# Patient Record
Sex: MALE | Race: WHITE | NOT HISPANIC OR LATINO | Employment: OTHER | URBAN - METROPOLITAN AREA
[De-identification: names, ages, dates, MRNs, and addresses within clinical notes are randomized per-mention and may not be internally consistent; named-entity substitution may affect disease eponyms.]

---

## 2020-11-04 ENCOUNTER — OFFICE VISIT (OUTPATIENT)
Dept: INTERNAL MEDICINE | Facility: CLINIC | Age: 35
End: 2020-11-04
Payer: OTHER GOVERNMENT

## 2020-11-04 VITALS
HEART RATE: 71 BPM | HEIGHT: 67 IN | BODY MASS INDEX: 25.81 KG/M2 | WEIGHT: 164.44 LBS | SYSTOLIC BLOOD PRESSURE: 120 MMHG | OXYGEN SATURATION: 99 % | DIASTOLIC BLOOD PRESSURE: 72 MMHG

## 2020-11-04 DIAGNOSIS — Z00.00 ENCOUNTER FOR HEALTH MAINTENANCE EXAMINATION IN ADULT: Primary | ICD-10-CM

## 2020-11-04 DIAGNOSIS — Z09 STATUS POST APPENDECTOMY, FOLLOW-UP EXAM: ICD-10-CM

## 2020-11-04 DIAGNOSIS — Z90.49 STATUS POST PARTIAL RESECTION OF COLON: ICD-10-CM

## 2020-11-04 DIAGNOSIS — Z13.220 SCREENING CHOLESTEROL LEVEL: ICD-10-CM

## 2020-11-04 DIAGNOSIS — Z11.4 ENCOUNTER FOR SCREENING FOR HIV: ICD-10-CM

## 2020-11-04 DIAGNOSIS — Z01.89 ROUTINE LAB DRAW: ICD-10-CM

## 2020-11-04 DIAGNOSIS — Z11.59 ENCOUNTER FOR HEPATITIS C SCREENING TEST FOR LOW RISK PATIENT: ICD-10-CM

## 2020-11-04 DIAGNOSIS — K35.30 ACUTE APPENDICITIS WITH LOCALIZED PERITONITIS WITHOUT PERFORATION, UNSPECIFIED WHETHER ABSCESS PRESENT, UNSPECIFIED WHETHER GANGRENE PRESENT: ICD-10-CM

## 2020-11-04 DIAGNOSIS — E66.3 OVERWEIGHT (BMI 25.0-29.9): ICD-10-CM

## 2020-11-04 PROBLEM — Z98.890 OTHER SPECIFIED POSTPROCEDURAL STATES: Status: ACTIVE | Noted: 2020-10-28

## 2020-11-04 PROBLEM — K35.80 ACUTE APPENDICITIS: Status: ACTIVE | Noted: 2020-10-27

## 2020-11-04 PROBLEM — K65.0: Status: ACTIVE | Noted: 2020-10-29

## 2020-11-04 PROCEDURE — 99999 PR PBB SHADOW E&M-NEW PATIENT-LVL IV: ICD-10-PCS | Mod: PBBFAC,,, | Performed by: NURSE PRACTITIONER

## 2020-11-04 PROCEDURE — 99385 PREV VISIT NEW AGE 18-39: CPT | Mod: S$PBB,,, | Performed by: NURSE PRACTITIONER

## 2020-11-04 PROCEDURE — 99999 PR PBB SHADOW E&M-NEW PATIENT-LVL IV: CPT | Mod: PBBFAC,,, | Performed by: NURSE PRACTITIONER

## 2020-11-04 PROCEDURE — 99204 OFFICE O/P NEW MOD 45 MIN: CPT | Mod: PBBFAC | Performed by: NURSE PRACTITIONER

## 2020-11-04 PROCEDURE — 99385 PR PREVENTIVE VISIT,NEW,18-39: ICD-10-PCS | Mod: S$PBB,,, | Performed by: NURSE PRACTITIONER

## 2020-11-04 RX ORDER — AMOXICILLIN AND CLAVULANATE POTASSIUM 875; 125 MG/1; MG/1
TABLET, FILM COATED ORAL
Status: ON HOLD | COMMUNITY
Start: 2020-11-01 | End: 2020-11-17 | Stop reason: HOSPADM

## 2020-11-04 RX ORDER — IBUPROFEN 600 MG/1
600 TABLET ORAL 3 TIMES DAILY
Status: ON HOLD | COMMUNITY
End: 2020-11-17 | Stop reason: HOSPADM

## 2020-11-04 RX ORDER — TRAMADOL HYDROCHLORIDE 50 MG/1
50 TABLET ORAL EVERY 12 HOURS PRN
Status: ON HOLD | COMMUNITY
Start: 2020-11-01 | End: 2020-11-17 | Stop reason: HOSPADM

## 2020-11-04 RX ORDER — CARBOXYMETHYLCELLULOSE SODIUM 5 MG/ML
SOLUTION/ DROPS OPHTHALMIC
Status: ON HOLD | COMMUNITY
Start: 2020-10-14 | End: 2020-11-17 | Stop reason: HOSPADM

## 2020-11-04 NOTE — PATIENT INSTRUCTIONS
Fasting lab orders, will call with results, if results ok, RTC in 1 yr for annual or sooner prn with one of MDs I work with who can be your new PCP: Dr. Landry Michael, Dr. Dionisio Stoddard, Dr. Elo Bradshaw, Dr. Alexandria James, Dr. Tano Benjamin, or  Dr. Gunnar Bedoya     F/U appt 11- with MD to clear to return to work on 12-1-2020

## 2020-11-04 NOTE — PROGRESS NOTES
Subjective:       Patient ID: Giuseppe Valdez is a 35 y.o. male.    Chief Complaint: Establish Care    New pt in to establish care. No previous PCP on file.    Recently in hospital in [place] at [facility] on 10-24-20 with peritonitis, gangrenous appendicitis, appendectomy performed and also had to have partial resection of colon afterwards, surgery done 10-. Discharged on 11-1-2020. He needs FMLA paperwork filled out for his job post op for 6 weeks Starting from 10- through 11-, return on 12-1-2020. 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 Still has some pain at puncture site from surgery. He is on oral Augmentin and prn tramadol for post op pain. He will not be able to fly on prn opioid pain meds and also cannot perform heavy lifting 6 weeks post op.    Review of Systems   Constitutional: Negative for activity change, appetite change, chills, fever and unexpected weight change.   HENT: Negative for hearing loss and voice change.    Eyes: Negative for visual disturbance.   Respiratory: Negative for apnea, cough, chest tightness and shortness of breath.    Cardiovascular: Negative for chest pain, palpitations and leg swelling.   Gastrointestinal: Positive for abdominal pain. Negative for abdominal distention, blood in stool, constipation, diarrhea, nausea and vomiting.        Post op as documented in HPI   Endocrine: Negative for cold intolerance, heat intolerance, polydipsia, polyphagia and polyuria.   Genitourinary: Negative for difficulty urinating, dysuria and penile pain.   Musculoskeletal: Negative for arthralgias and myalgias.   Integumentary:  Negative for color change, pallor, rash and wound.   Allergic/Immunologic: Negative for environmental allergies, food allergies and frequent infections.   Neurological: Negative for dizziness, weakness, light-headedness, numbness and headaches.   Hematological: Negative for adenopathy. Does not bruise/bleed easily.    Psychiatric/Behavioral: Negative for agitation, behavioral problems, sleep disturbance and suicidal ideas.     Current Outpatient Medications:     amoxicillin-clavulanate 875-125mg (AUGMENTIN) 875-125 mg per tablet, , Disp: , Rfl:     ibuprofen (ADVIL,MOTRIN) 600 MG tablet, Take 600 mg by mouth 3 (three) times daily., Disp: , Rfl:     REFRESH TEARS 0.5 % Drop, , Disp: , Rfl:     traMADoL (ULTRAM) 50 mg tablet, 50 mg every 12 (twelve) hours as needed for Pain. , Disp: , Rfl:     Patient Active Problem List   Diagnosis    Acute appendicitis with localized peritonitis    Acute appendicitis    Acute generalized peritonitis    Other specified postprocedural states    Status post appendectomy, follow-up exam    Status post partial resection of colon     History reviewed. No pertinent past medical history.     Past Surgical History:   Procedure Laterality Date    APPENDECTOMY  29-30Oct20    COLON SURGERY  29-30Oct20    right tibula and fibula retained hardware      right wrist reconstruction of torn ligaments Right       Social History     Socioeconomic History    Marital status: Single     Spouse name: Not on file    Number of children: Not on file    Years of education: Not on file    Highest education level: Not on file   Occupational History    Not on file   Social Needs    Financial resource strain: Not on file    Food insecurity     Worry: Not on file     Inability: Not on file    Transportation needs     Medical: Not on file     Non-medical: Not on file   Tobacco Use    Smoking status: Passive Smoke Exposure - Never Smoker    Smokeless tobacco: Never Used   Substance and Sexual Activity    Alcohol use: Yes     Alcohol/week: 3.0 standard drinks     Types: 3 Cans of beer per week    Drug use: Never    Sexual activity: Yes     Partners: Female   Lifestyle    Physical activity     Days per week: Not on file     Minutes per session: Not on file    Stress: Not at all   Relationships     "Social connections     Talks on phone: Not on file     Gets together: Not on file     Attends [employer] service: Not on file     Active member of club or organization: Not on file     Attends meetings of clubs or organizations: Not on file     Relationship status: Not on file   Other Topics Concern    Not on file   Social History Narrative    Not on file     History reviewed. No pertinent family history.     Objective:       Vitals:    11/04/20 1129   BP: 120/72   Pulse: 71   SpO2: 99%   Weight: 74.6 kg (164 lb 7.4 oz)   Height: 5' 7" (1.702 m)   PainSc: 0-No pain      Body mass index is 25.76 kg/m².     Physical Exam  Vitals signs and nursing note reviewed.   Constitutional:       Appearance: Normal appearance. He is well-developed.   HENT:      Head: Normocephalic.      Nose: Nose normal.      Mouth/Throat:      Mouth: Mucous membranes are moist.      Pharynx: Oropharynx is clear.   Eyes:      General: Lids are normal. Lids are everted, no foreign bodies appreciated.      Extraocular Movements: Extraocular movements intact.      Conjunctiva/sclera: Conjunctivae normal.      Pupils: Pupils are equal, round, and reactive to light.   Neck:      Musculoskeletal: Full passive range of motion without pain, normal range of motion and neck supple.      Vascular: No carotid bruit or JVD.      Trachea: Trachea normal.   Cardiovascular:      Rate and Rhythm: Normal rate and regular rhythm.      Pulses: Normal pulses.      Heart sounds: Normal heart sounds.   Pulmonary:      Effort: Pulmonary effort is normal.      Breath sounds: Normal breath sounds.   Abdominal:      General: Abdomen is flat. Bowel sounds are normal.      Palpations: Abdomen is soft.      Tenderness: There is abdominal tenderness.      Comments: Mild tenderness around umbilicus    Surgical puncture sites noted to abdomen, scabbed and healed   Musculoskeletal: Normal range of motion.   Skin:     General: Skin is warm and dry.      Capillary Refill: " Capillary refill takes less than 2 seconds.   Neurological:      General: No focal deficit present.      Mental Status: He is alert and oriented to person, place, and time.   Psychiatric:         Mood and Affect: Mood normal.         Speech: Speech normal.         Behavior: Behavior normal.         Thought Content: Thought content normal.         Judgment: Judgment normal.       Assessment:       1. Encounter for health maintenance examination in adult    2. Routine lab draw    3. Encounter for hepatitis C screening test for low risk patient    4. Encounter for screening for HIV    5. Screening cholesterol level    6. BMI 25.0-25.9,adult    7. Overweight (BMI 25.0-29.9)    8. Acute appendicitis with localized peritonitis without perforation, unspecified whether abscess present, unspecified whether gangrene present    9. Status post appendectomy, follow-up exam    10. Status post partial resection of colon            Plan:     Giuseppe was seen today for establish care.    Diagnoses and all orders for this visit:    Encounter for health maintenance examination in adult  Annual wellness exam completed.    All medications, histories, and concerns reviewed, reconciled, and addressed.    Appropriate Screenings per pt's sex and age have been reviewed and discussed with pt.    BMI reviewed.    Routine lab draw  -     CBC Auto Differential; Future  -     Comprehensive Metabolic Panel; Future  -     TSH; Future    Encounter for hepatitis C screening test for low risk patient  -     Hepatitis C Antibody; Future    Encounter for screening for HIV  -     HIV 1/2 Ag/Ab (4th Gen); Future    Screening cholesterol level  -     Lipid Panel; Future    BMI 25.0-25.9,adult  BMI reviewed    Overweight (BMI 25.0-29.9)  BMI reivewed    Acute appendicitis with localized peritonitis without perforation, unspecified whether abscess present, unspecified whether gangrene present  Status post appendectomy, follow-up exam  Status post partial  resection of colon  FMLA paperwork will be completed dated 10- through 11- with a return date of 12-1-2020. Pt is not to perform  duties due to prn opioid pain meds post op and also no heavy lifting or strenuous activity for 6 weeks post abdominal surgery.    Fasting lab orders, will call with results, if results ok, RTC in 1 yr for annual or sooner prn with one of MDs I work with who can be your new PCP: Dr. Landry Michael, Dr. Dionisio Stoddard, Dr. Elo Bradshaw, Dr. Alexandria James, Dr. Tano Benjamin, or  Dr. Gunnar Bedoya     F/U appt 11- with MD to clear to return to work on 12-1-2020.    Follow up in about 19 days (around 11/23/2020) for with one of MDs to clear pt to return to work.

## 2020-11-05 ENCOUNTER — PATIENT MESSAGE (OUTPATIENT)
Dept: INTERNAL MEDICINE | Facility: CLINIC | Age: 35
End: 2020-11-05

## 2020-11-07 ENCOUNTER — HOSPITAL ENCOUNTER (INPATIENT)
Facility: OTHER | Age: 35
LOS: 10 days | Discharge: HOME | DRG: 388 | End: 2020-11-17
Attending: EMERGENCY MEDICINE | Admitting: EMERGENCY MEDICINE
Payer: OTHER GOVERNMENT

## 2020-11-07 DIAGNOSIS — G89.18 POST-OP PAIN: ICD-10-CM

## 2020-11-07 DIAGNOSIS — R93.5 ABNORMAL CT OF THE ABDOMEN: ICD-10-CM

## 2020-11-07 DIAGNOSIS — K56.609 SBO (SMALL BOWEL OBSTRUCTION): Primary | ICD-10-CM

## 2020-11-07 PROBLEM — T81.43XA POSTPROCEDURAL INTRAABDOMINAL ABSCESS: Status: ACTIVE | Noted: 2020-11-07

## 2020-11-07 PROBLEM — R79.89 ELEVATED LFTS: Status: ACTIVE | Noted: 2020-11-07

## 2020-11-07 LAB
ALBUMIN SERPL BCP-MCNC: 3.9 G/DL (ref 3.5–5.2)
ALP SERPL-CCNC: 131 U/L (ref 55–135)
ALT SERPL W/O P-5'-P-CCNC: 210 U/L (ref 10–44)
ANION GAP SERPL CALC-SCNC: 14 MMOL/L (ref 8–16)
AST SERPL-CCNC: 50 U/L (ref 10–40)
BASOPHILS # BLD AUTO: 0.05 K/UL (ref 0–0.2)
BASOPHILS NFR BLD: 0.3 % (ref 0–1.9)
BILIRUB SERPL-MCNC: 0.3 MG/DL (ref 0.1–1)
BUN SERPL-MCNC: 18 MG/DL (ref 6–20)
CALCIUM SERPL-MCNC: 9.6 MG/DL (ref 8.7–10.5)
CHLORIDE SERPL-SCNC: 100 MMOL/L (ref 95–110)
CO2 SERPL-SCNC: 23 MMOL/L (ref 23–29)
CREAT SERPL-MCNC: 1.2 MG/DL (ref 0.5–1.4)
CTP QC/QA: YES
DIFFERENTIAL METHOD: ABNORMAL
EOSINOPHIL # BLD AUTO: 0 K/UL (ref 0–0.5)
EOSINOPHIL NFR BLD: 0.2 % (ref 0–8)
ERYTHROCYTE [DISTWIDTH] IN BLOOD BY AUTOMATED COUNT: 12 % (ref 11.5–14.5)
EST. GFR  (AFRICAN AMERICAN): >60 ML/MIN/1.73 M^2
EST. GFR  (NON AFRICAN AMERICAN): >60 ML/MIN/1.73 M^2
GLUCOSE SERPL-MCNC: 140 MG/DL (ref 70–110)
HCT VFR BLD AUTO: 41.3 % (ref 40–54)
HGB BLD-MCNC: 14.1 G/DL (ref 14–18)
IMM GRANULOCYTES # BLD AUTO: 0.11 K/UL (ref 0–0.04)
IMM GRANULOCYTES NFR BLD AUTO: 0.7 % (ref 0–0.5)
LACTATE SERPL-SCNC: 0.6 MMOL/L (ref 0.5–2.2)
LIPASE SERPL-CCNC: 40 U/L (ref 4–60)
LYMPHOCYTES # BLD AUTO: 1.9 K/UL (ref 1–4.8)
LYMPHOCYTES NFR BLD: 11.1 % (ref 18–48)
MCH RBC QN AUTO: 30.6 PG (ref 27–31)
MCHC RBC AUTO-ENTMCNC: 34.1 G/DL (ref 32–36)
MCV RBC AUTO: 90 FL (ref 82–98)
MONOCYTES # BLD AUTO: 0.9 K/UL (ref 0.3–1)
MONOCYTES NFR BLD: 5.1 % (ref 4–15)
NEUTROPHILS # BLD AUTO: 13.9 K/UL (ref 1.8–7.7)
NEUTROPHILS NFR BLD: 82.6 % (ref 38–73)
NRBC BLD-RTO: 0 /100 WBC
PLATELET # BLD AUTO: 459 K/UL (ref 150–350)
PMV BLD AUTO: 8.7 FL (ref 9.2–12.9)
POTASSIUM SERPL-SCNC: 4 MMOL/L (ref 3.5–5.1)
PROT SERPL-MCNC: 8.2 G/DL (ref 6–8.4)
RBC # BLD AUTO: 4.61 M/UL (ref 4.6–6.2)
SARS-COV-2 RDRP RESP QL NAA+PROBE: NEGATIVE
SODIUM SERPL-SCNC: 137 MMOL/L (ref 136–145)
WBC # BLD AUTO: 16.87 K/UL (ref 3.9–12.7)

## 2020-11-07 PROCEDURE — 25000003 PHARM REV CODE 250: Performed by: INTERNAL MEDICINE

## 2020-11-07 PROCEDURE — 85025 COMPLETE CBC W/AUTO DIFF WBC: CPT

## 2020-11-07 PROCEDURE — 96376 TX/PRO/DX INJ SAME DRUG ADON: CPT

## 2020-11-07 PROCEDURE — 83690 ASSAY OF LIPASE: CPT

## 2020-11-07 PROCEDURE — 25500020 PHARM REV CODE 255: Performed by: EMERGENCY MEDICINE

## 2020-11-07 PROCEDURE — 80053 COMPREHEN METABOLIC PANEL: CPT

## 2020-11-07 PROCEDURE — 96374 THER/PROPH/DIAG INJ IV PUSH: CPT

## 2020-11-07 PROCEDURE — 99223 PR INITIAL HOSPITAL CARE,LEVL III: ICD-10-PCS | Mod: ,,, | Performed by: INTERNAL MEDICINE

## 2020-11-07 PROCEDURE — 94761 N-INVAS EAR/PLS OXIMETRY MLT: CPT

## 2020-11-07 PROCEDURE — 83605 ASSAY OF LACTIC ACID: CPT

## 2020-11-07 PROCEDURE — 63600175 PHARM REV CODE 636 W HCPCS: Performed by: INTERNAL MEDICINE

## 2020-11-07 PROCEDURE — U0002 COVID-19 LAB TEST NON-CDC: HCPCS | Performed by: EMERGENCY MEDICINE

## 2020-11-07 PROCEDURE — 25000003 PHARM REV CODE 250: Performed by: EMERGENCY MEDICINE

## 2020-11-07 PROCEDURE — 99285 EMERGENCY DEPT VISIT HI MDM: CPT | Mod: 25

## 2020-11-07 PROCEDURE — 63600175 PHARM REV CODE 636 W HCPCS

## 2020-11-07 PROCEDURE — 63600175 PHARM REV CODE 636 W HCPCS: Performed by: EMERGENCY MEDICINE

## 2020-11-07 PROCEDURE — 96361 HYDRATE IV INFUSION ADD-ON: CPT

## 2020-11-07 PROCEDURE — 96375 TX/PRO/DX INJ NEW DRUG ADDON: CPT

## 2020-11-07 PROCEDURE — 99223 1ST HOSP IP/OBS HIGH 75: CPT | Mod: ,,, | Performed by: INTERNAL MEDICINE

## 2020-11-07 PROCEDURE — 11000001 HC ACUTE MED/SURG PRIVATE ROOM

## 2020-11-07 RX ORDER — HYDROMORPHONE HYDROCHLORIDE 1 MG/ML
0.5 INJECTION, SOLUTION INTRAMUSCULAR; INTRAVENOUS; SUBCUTANEOUS
Status: DISCONTINUED | OUTPATIENT
Start: 2020-11-07 | End: 2020-11-09

## 2020-11-07 RX ORDER — HYDROMORPHONE HYDROCHLORIDE 1 MG/ML
0.5 INJECTION, SOLUTION INTRAMUSCULAR; INTRAVENOUS; SUBCUTANEOUS
Status: COMPLETED | OUTPATIENT
Start: 2020-11-07 | End: 2020-11-07

## 2020-11-07 RX ORDER — SODIUM CHLORIDE 9 MG/ML
INJECTION, SOLUTION INTRAVENOUS CONTINUOUS
Status: DISCONTINUED | OUTPATIENT
Start: 2020-11-07 | End: 2020-11-07

## 2020-11-07 RX ORDER — MORPHINE SULFATE 4 MG/ML
4 INJECTION, SOLUTION INTRAMUSCULAR; INTRAVENOUS
Status: ACTIVE | OUTPATIENT
Start: 2020-11-07 | End: 2020-11-07

## 2020-11-07 RX ORDER — KETOROLAC TROMETHAMINE 30 MG/ML
15 INJECTION, SOLUTION INTRAMUSCULAR; INTRAVENOUS EVERY 6 HOURS PRN
Status: DISCONTINUED | OUTPATIENT
Start: 2020-11-07 | End: 2020-11-09

## 2020-11-07 RX ORDER — GLUCAGON 1 MG
1 KIT INJECTION
Status: DISCONTINUED | OUTPATIENT
Start: 2020-11-07 | End: 2020-11-17 | Stop reason: HOSPADM

## 2020-11-07 RX ORDER — HYDROMORPHONE HYDROCHLORIDE 1 MG/ML
0.5 INJECTION, SOLUTION INTRAMUSCULAR; INTRAVENOUS; SUBCUTANEOUS
Status: DISCONTINUED | OUTPATIENT
Start: 2020-11-07 | End: 2020-11-07

## 2020-11-07 RX ORDER — IBUPROFEN 200 MG
24 TABLET ORAL
Status: DISCONTINUED | OUTPATIENT
Start: 2020-11-07 | End: 2020-11-17 | Stop reason: HOSPADM

## 2020-11-07 RX ORDER — ACETAMINOPHEN 325 MG/1
650 TABLET ORAL EVERY 4 HOURS PRN
Status: DISCONTINUED | OUTPATIENT
Start: 2020-11-07 | End: 2020-11-17 | Stop reason: HOSPADM

## 2020-11-07 RX ORDER — MORPHINE SULFATE 4 MG/ML
4 INJECTION, SOLUTION INTRAMUSCULAR; INTRAVENOUS EVERY 4 HOURS PRN
Status: DISCONTINUED | OUTPATIENT
Start: 2020-11-07 | End: 2020-11-07

## 2020-11-07 RX ORDER — MORPHINE SULFATE 4 MG/ML
4 INJECTION, SOLUTION INTRAMUSCULAR; INTRAVENOUS
Status: COMPLETED | OUTPATIENT
Start: 2020-11-07 | End: 2020-11-07

## 2020-11-07 RX ORDER — MORPHINE SULFATE 4 MG/ML
4 INJECTION, SOLUTION INTRAMUSCULAR; INTRAVENOUS EVERY 4 HOURS PRN
Status: DISCONTINUED | OUTPATIENT
Start: 2020-11-07 | End: 2020-11-07 | Stop reason: SDUPTHER

## 2020-11-07 RX ORDER — MORPHINE SULFATE 10 MG/ML
INJECTION INTRAMUSCULAR; INTRAVENOUS; SUBCUTANEOUS
Status: COMPLETED
Start: 2020-11-07 | End: 2020-11-07

## 2020-11-07 RX ORDER — MORPHINE SULFATE 2 MG/ML
2 INJECTION, SOLUTION INTRAMUSCULAR; INTRAVENOUS EVERY 4 HOURS PRN
Status: DISCONTINUED | OUTPATIENT
Start: 2020-11-07 | End: 2020-11-07

## 2020-11-07 RX ORDER — SODIUM CHLORIDE 0.9 % (FLUSH) 0.9 %
10 SYRINGE (ML) INJECTION
Status: DISCONTINUED | OUTPATIENT
Start: 2020-11-07 | End: 2020-11-17 | Stop reason: HOSPADM

## 2020-11-07 RX ORDER — ONDANSETRON 2 MG/ML
4 INJECTION INTRAMUSCULAR; INTRAVENOUS EVERY 8 HOURS PRN
Status: DISCONTINUED | OUTPATIENT
Start: 2020-11-07 | End: 2020-11-07

## 2020-11-07 RX ORDER — ONDANSETRON 2 MG/ML
4 INJECTION INTRAMUSCULAR; INTRAVENOUS EVERY 6 HOURS PRN
Status: DISCONTINUED | OUTPATIENT
Start: 2020-11-07 | End: 2020-11-17 | Stop reason: HOSPADM

## 2020-11-07 RX ORDER — SODIUM CHLORIDE 9 MG/ML
1000 INJECTION, SOLUTION INTRAVENOUS
Status: COMPLETED | OUTPATIENT
Start: 2020-11-07 | End: 2020-11-07

## 2020-11-07 RX ORDER — SODIUM CHLORIDE, SODIUM LACTATE, POTASSIUM CHLORIDE, CALCIUM CHLORIDE 600; 310; 30; 20 MG/100ML; MG/100ML; MG/100ML; MG/100ML
INJECTION, SOLUTION INTRAVENOUS CONTINUOUS
Status: DISCONTINUED | OUTPATIENT
Start: 2020-11-07 | End: 2020-11-16

## 2020-11-07 RX ORDER — IBUPROFEN 200 MG
16 TABLET ORAL
Status: DISCONTINUED | OUTPATIENT
Start: 2020-11-07 | End: 2020-11-17 | Stop reason: HOSPADM

## 2020-11-07 RX ADMIN — MORPHINE SULFATE 4 MG: 4 INJECTION, SOLUTION INTRAMUSCULAR; INTRAVENOUS at 03:11

## 2020-11-07 RX ADMIN — HYDROMORPHONE HYDROCHLORIDE 0.5 MG: 1 INJECTION, SOLUTION INTRAMUSCULAR; INTRAVENOUS; SUBCUTANEOUS at 02:11

## 2020-11-07 RX ADMIN — SODIUM CHLORIDE, SODIUM LACTATE, POTASSIUM CHLORIDE, AND CALCIUM CHLORIDE: .6; .31; .03; .02 INJECTION, SOLUTION INTRAVENOUS at 10:11

## 2020-11-07 RX ADMIN — MORPHINE SULFATE 4 MG: 10 INJECTION INTRAVENOUS at 07:11

## 2020-11-07 RX ADMIN — PIPERACILLIN AND TAZOBACTAM 4.5 G: 4; .5 INJECTION, POWDER, LYOPHILIZED, FOR SOLUTION INTRAVENOUS; PARENTERAL at 06:11

## 2020-11-07 RX ADMIN — MORPHINE SULFATE 4 MG: 4 INJECTION, SOLUTION INTRAMUSCULAR; INTRAVENOUS at 04:11

## 2020-11-07 RX ADMIN — PIPERACILLIN AND TAZOBACTAM 4.5 G: 4; .5 INJECTION, POWDER, LYOPHILIZED, FOR SOLUTION INTRAVENOUS; PARENTERAL at 02:11

## 2020-11-07 RX ADMIN — HYDROMORPHONE HYDROCHLORIDE 0.5 MG: 1 INJECTION, SOLUTION INTRAMUSCULAR; INTRAVENOUS; SUBCUTANEOUS at 08:11

## 2020-11-07 RX ADMIN — KETOROLAC TROMETHAMINE 15 MG: 30 INJECTION, SOLUTION INTRAMUSCULAR; INTRAVENOUS at 10:11

## 2020-11-07 RX ADMIN — HYDROMORPHONE HYDROCHLORIDE 0.5 MG: 1 INJECTION, SOLUTION INTRAMUSCULAR; INTRAVENOUS; SUBCUTANEOUS at 06:11

## 2020-11-07 RX ADMIN — HYDROMORPHONE HYDROCHLORIDE 0.5 MG: 1 INJECTION, SOLUTION INTRAMUSCULAR; INTRAVENOUS; SUBCUTANEOUS at 10:11

## 2020-11-07 RX ADMIN — SODIUM CHLORIDE 1000 ML: 0.9 INJECTION, SOLUTION INTRAVENOUS at 05:11

## 2020-11-07 RX ADMIN — PIPERACILLIN AND TAZOBACTAM 4.5 G: 4; .5 INJECTION, POWDER, LYOPHILIZED, FOR SOLUTION INTRAVENOUS; PARENTERAL at 10:11

## 2020-11-07 RX ADMIN — ONDANSETRON 4 MG: 2 INJECTION INTRAMUSCULAR; INTRAVENOUS at 02:11

## 2020-11-07 RX ADMIN — KETOROLAC TROMETHAMINE 15 MG: 30 INJECTION, SOLUTION INTRAMUSCULAR; INTRAVENOUS at 07:11

## 2020-11-07 RX ADMIN — HYDROMORPHONE HYDROCHLORIDE 0.5 MG: 1 INJECTION, SOLUTION INTRAMUSCULAR; INTRAVENOUS; SUBCUTANEOUS at 01:11

## 2020-11-07 RX ADMIN — HYDROMORPHONE HYDROCHLORIDE 0.5 MG: 1 INJECTION, SOLUTION INTRAMUSCULAR; INTRAVENOUS; SUBCUTANEOUS at 09:11

## 2020-11-07 RX ADMIN — IOHEXOL 75 ML: 350 INJECTION, SOLUTION INTRAVENOUS at 04:11

## 2020-11-07 RX ADMIN — HYDROMORPHONE HYDROCHLORIDE 0.5 MG: 1 INJECTION, SOLUTION INTRAMUSCULAR; INTRAVENOUS; SUBCUTANEOUS at 04:11

## 2020-11-07 NOTE — ASSESSMENT & PLAN NOTE
Intraabdominal abscess  - s/p laparoscopic appendectomy for acute gangrenous appendicitis with associated abscess on 10/27/20  - Surgery complicated by peritonitis s/p diagnostic laparoscopy with abdominal washout 10/29/20  - CT abd/pelvis shows SBO with transition point in the R mid pelvis and a fluid collection consistent with abscess  - WBC 16  - NPO, IV fluids  - Ketorolac and dilaudid PRN for pain control  - Start empiric Zosyn 4.5 mg IV q8h  - General surgery consulted

## 2020-11-07 NOTE — HPI
34 y/o M presents to the ED today complaining of abdominal pain. He was hospitalized in [place] 10/27-11/1 where he was diagnosed with gangrenous appendicitis s/p laparoscopic appendectomy on 10/27 complicated by peritonitis s/p exploratory laparoscopy on 10/29. After discharge he was improving until early this morning when he had sudden onset of severe lower abdominal pain. He denies associated fever, chills, nausea, vomiting or diarrhea. He has been taking augmentin since discharge. He took tramadol for the pain without improvement. Pain was rated 8-9/10 at its worst after tramadol so he presented to the ED for evaluation. His last BM was yesterday. In the ED he was found to have SBO with abscess so referred for admission.    He works as a  but currently on leave due to recent surgery. He denies any medical problems and takes no chronic medications.

## 2020-11-07 NOTE — ED NOTES
Bedside report given by CARY Gutiererz. Comfort and positioning addressed. Toileting needs addressed. Pain (0/10). Pt remains attached to pulse ox and BP cycled. Bed rails up x2, bed locked and at lowest position, call remains at beside within reach of patient, instructed on use. AAOx4. RR even and unlabored. Pt updated on plan of care. Pt verbalized understanding. Will continue to monitor.

## 2020-11-07 NOTE — H&P
"[facility]  History & Physical    Patient Name: Giuseppe Valdez  MRN: [id number]  Admission Date: 11/7/2020  Attending Physician: Flor Galvan MD   Primary Care Provider: Primary Doctor No         Patient information was obtained from patient, past medical records and ER records.     Subjective:     Principal Problem:SBO (small bowel obstruction)    Chief Complaint:   Chief Complaint   Patient presents with    Abdominal Pain     intermittent, sharp periumbilical since 2000 last night. Had appendectomy last week, feels like pain is r/t "overdoing it" today. Using IBU and tramadol without relief.         HPI: 34 y/o M presents to the ED today complaining of abdominal pain. He was hospitalized in [place] 10/27-11/1 where he was diagnosed with gangrenous appendicitis s/p laparoscopic appendectomy on 10/27 complicated by peritonitis s/p exploratory laparoscopy on 10/29. After discharge he was improving until early this morning when he had sudden onset of severe lower abdominal pain. He denies associated fever, chills, nausea, vomiting or diarrhea. He has been taking augmentin since discharge. He took tramadol for the pain without improvement. Pain was rated 8-9/10 at its worst after tramadol so he presented to the ED for evaluation. His last BM was yesterday. In the ED he was found to have SBO with abscess so referred for admission.    He works as a  but currently on leave due to recent surgery. He denies any medical problems and takes no chronic medications.    History reviewed. No pertinent past medical history.    Past Surgical History:   Procedure Laterality Date    APPENDECTOMY  29-30Oct20    COLON SURGERY  29-30Oct20    right tibula and fibula retained hardware      right wrist reconstruction of torn ligaments Right        Review of patient's allergies indicates:   Allergen Reactions    Grass pollen-june grass standard     Mold        No current " facility-administered medications on file prior to encounter.      Current Outpatient Medications on File Prior to Encounter   Medication Sig    amoxicillin-clavulanate 875-125mg (AUGMENTIN) 875-125 mg per tablet     traMADoL (ULTRAM) 50 mg tablet 50 mg every 12 (twelve) hours as needed for Pain.     ibuprofen (ADVIL,MOTRIN) 600 MG tablet Take 600 mg by mouth 3 (three) times daily.    REFRESH TEARS 0.5 % Drop      Family History     None        Tobacco Use    Smoking status: Passive Smoke Exposure - Never Smoker    Smokeless tobacco: Never Used   Substance and Sexual Activity    Alcohol use: Yes     Alcohol/week: 3.0 standard drinks     Types: 3 Cans of beer per week    Drug use: Never    Sexual activity: Yes     Partners: Female     Review of Systems   Constitutional: Negative for chills and fever.   HENT: Negative.    Eyes: Negative.    Respiratory: Negative for cough and shortness of breath.    Cardiovascular: Negative for chest pain and palpitations.   Gastrointestinal: Positive for abdominal pain. Negative for abdominal distention, diarrhea, nausea and vomiting.   Genitourinary: Negative.    Musculoskeletal: Negative.    Neurological: Negative.    Psychiatric/Behavioral: Negative.    All other systems reviewed and are negative.    Objective:     Vital Signs (Most Recent):  Temp: 98.1 °F (36.7 °C) (11/07/20 0836)  Pulse: 69 (11/07/20 0836)  Resp: 18 (11/07/20 0926)  BP: 131/80 (11/07/20 0836)  SpO2: 95 % (11/07/20 0836) Vital Signs (24h Range):  Temp:  [97.7 °F (36.5 °C)-98.1 °F (36.7 °C)] 98.1 °F (36.7 °C)  Pulse:  [66-81] 69  Resp:  [18] 18  SpO2:  [95 %-99 %] 95 %  BP: (130-142)/(70-86) 131/80     Weight: 74.8 kg (165 lb)  Body mass index is 25.84 kg/m².    Physical Exam  Vitals signs and nursing note reviewed.   Constitutional:       Appearance: He is well-developed.      Comments: Lying in bed. Appears uncomfortable due to pain   HENT:      Head: Normocephalic and atraumatic.   Eyes:      General:          Right eye: No discharge.         Left eye: No discharge.      Conjunctiva/sclera: Conjunctivae normal.   Neck:      Musculoskeletal: Normal range of motion and neck supple.   Cardiovascular:      Rate and Rhythm: Normal rate and regular rhythm.      Heart sounds: Normal heart sounds.   Pulmonary:      Effort: Pulmonary effort is normal. No respiratory distress.      Breath sounds: Normal breath sounds.   Abdominal:      Tenderness: There is abdominal tenderness (throughout lower abdomen, worse in LLQ). There is guarding.      Comments: Bowel sounds absent. Abdomen non-distended. Clean, dry surgical incisions noted without erythema or drainage   Musculoskeletal: Normal range of motion.      Right lower leg: No edema.      Left lower leg: No edema.   Skin:     General: Skin is warm and dry.   Neurological:      Mental Status: He is alert and oriented to person, place, and time.   Psychiatric:         Behavior: Behavior normal.             Significant Labs:   CBC:   Recent Labs   Lab 11/07/20  0304   WBC 16.87*   HGB 14.1   HCT 41.3   *     CMP:   Recent Labs   Lab 11/07/20  0304      K 4.0      CO2 23   *   BUN 18   CREATININE 1.2   CALCIUM 9.6   PROT 8.2   ALBUMIN 3.9   BILITOT 0.3   ALKPHOS 131   AST 50*   *   ANIONGAP 14   EGFRNONAA >60     All pertinent labs within the past 24 hours have been reviewed.    Significant Imaging: I have reviewed and interpreted all pertinent imaging results/findings within the past 24 hours.    Assessment/Plan:     * SBO (small bowel obstruction)  Intraabdominal abscess  - s/p laparoscopic appendectomy for acute gangrenous appendicitis with associated abscess on 10/27/20  - Surgery complicated by peritonitis s/p diagnostic laparoscopy with abdominal washout 10/29/20  - CT abd/pelvis shows SBO with transition point in the R mid pelvis and a fluid collection consistent with abscess  - WBC 16  - NPO, IV fluids  - Ketorolac and dilaudid PRN for pain  control  - Start empiric Zosyn 4.5 mg IV q8h  - General surgery consulted    Elevated LFTs  - AST/ALT 50/210. ALP, bilirubin WNL  - Records reviewed. Prior LFTs WNL  - Liver normal on imaging  - Monitor for now      VTE Risk Mitigation (From admission, onward)         Ordered     Place sequential compression device  Until discontinued      11/07/20 0836     IP VTE LOW RISK PATIENT  Once      11/07/20 0836                   Flor Galvan MD  Department of Hospital Medicine   [facility]

## 2020-11-07 NOTE — NURSING
Pt free of trauma, falls, and injury. Pt VSS and afebrile throughout shift. Pt free of skin breakdown. Pt pain has been moderately controlled by iv pain meds and tolerated well. Pt has been voiding adequately throughout shift. Pt has call light in reach, bed alarm on, bed brakes on, side rails up x2, bed in low position, SCDs on/ Pt lying in bed in no distress. Purposeful rounding performed this shift.

## 2020-11-07 NOTE — CONSULTS
[facility]  Consult Note      Date of Consultation:11/7/2020    Reason for Consult:  Abdominal pain  SUBJECTIVE:     History of Present Illness:  35-year-old male who is status post laparoscopic appendectomy on 10/27 2020 for ruptured appendix with abscess.  This surgery was performed at the [facility] in [place].  Two days later the patient was returned to surgery for abdominal washout.  Patient's condition improved he was subsequently discharged and returned to [place].  Patient was feeling well until yesterday when he began to have severe abdominal pain.  He denied fever, chills, nausea, vomiting.  Patient presented to the emergency room where he was seen to have focally dilated loops of small bowel and small fluid collection in the posterior pelvis.  Patient's white blood cell count was [id number] on admission.    Review of patient's allergies indicates:   Allergen Reactions    Grass pollen-june grass standard     Mold        History reviewed. No pertinent past medical history.  Past Surgical History:   Procedure Laterality Date    APPENDECTOMY  29-30Oct20    COLON SURGERY  29-30Oct20    right tibula and fibula retained hardware      right wrist reconstruction of torn ligaments Right      History reviewed. No pertinent family history.  Social History     Tobacco Use    Smoking status: Passive Smoke Exposure - Never Smoker    Smokeless tobacco: Never Used   Substance Use Topics    Alcohol use: Yes     Alcohol/week: 3.0 standard drinks     Types: 3 Cans of beer per week    Drug use: Never            Physical Exam:  Well-developed well-nourished male in no acute distress.  HEENT-anicteric atraumatic  Chest-clear  Heart-regular rate and rhythm  Abdomen-mild distention, few bowel sounds, incisions clean and dry, no rebound, no guarding    Impression/plan:  Ileus, possible involving abscess  Continue antibiotics will follow with serial exam and evaluation.    Thank you for the opportunity  of seeing Giuseppe Valdez in consultation

## 2020-11-07 NOTE — ED TRIAGE NOTES
"Pt presents to ER via POV with c/o intermittent, sharp periumbilical pain that started around 2000 last night. Pt reports having appendectomy x2 weeks ago then had exploratory lap last week, and feels he "overdid it" last night. He is taking Augmentin, tramadol, and IBU with minimal relief.   "

## 2020-11-07 NOTE — SUBJECTIVE & OBJECTIVE
History reviewed. No pertinent past medical history.    Past Surgical History:   Procedure Laterality Date    APPENDECTOMY  29-30Oct20    COLON SURGERY  29-30Oct20    right tibula and fibula retained hardware      right wrist reconstruction of torn ligaments Right        Review of patient's allergies indicates:   Allergen Reactions    Grass pollen-rosanna grass standard     Mold        No current facility-administered medications on file prior to encounter.      Current Outpatient Medications on File Prior to Encounter   Medication Sig    amoxicillin-clavulanate 875-125mg (AUGMENTIN) 875-125 mg per tablet     traMADoL (ULTRAM) 50 mg tablet 50 mg every 12 (twelve) hours as needed for Pain.     ibuprofen (ADVIL,MOTRIN) 600 MG tablet Take 600 mg by mouth 3 (three) times daily.    REFRESH TEARS 0.5 % Drop      Family History     None        Tobacco Use    Smoking status: Passive Smoke Exposure - Never Smoker    Smokeless tobacco: Never Used   Substance and Sexual Activity    Alcohol use: Yes     Alcohol/week: 3.0 standard drinks     Types: 3 Cans of beer per week    Drug use: Never    Sexual activity: Yes     Partners: Female     Review of Systems   Constitutional: Negative for chills and fever.   HENT: Negative.    Eyes: Negative.    Respiratory: Negative for cough and shortness of breath.    Cardiovascular: Negative for chest pain and palpitations.   Gastrointestinal: Positive for abdominal pain. Negative for abdominal distention, diarrhea, nausea and vomiting.   Genitourinary: Negative.    Musculoskeletal: Negative.    Neurological: Negative.    Psychiatric/Behavioral: Negative.    All other systems reviewed and are negative.    Objective:     Vital Signs (Most Recent):  Temp: 98.1 °F (36.7 °C) (11/07/20 0836)  Pulse: 69 (11/07/20 0836)  Resp: 18 (11/07/20 0926)  BP: 131/80 (11/07/20 0836)  SpO2: 95 % (11/07/20 0836) Vital Signs (24h Range):  Temp:  [97.7 °F (36.5 °C)-98.1 °F (36.7 °C)] 98.1 °F (36.7  °C)  Pulse:  [66-81] 69  Resp:  [18] 18  SpO2:  [95 %-99 %] 95 %  BP: (130-142)/(70-86) 131/80     Weight: 74.8 kg (165 lb)  Body mass index is 25.84 kg/m².    Physical Exam  Vitals signs and nursing note reviewed.   Constitutional:       Appearance: He is well-developed.      Comments: Lying in bed. Appears uncomfortable due to pain   HENT:      Head: Normocephalic and atraumatic.   Eyes:      General:         Right eye: No discharge.         Left eye: No discharge.      Conjunctiva/sclera: Conjunctivae normal.   Neck:      Musculoskeletal: Normal range of motion and neck supple.   Cardiovascular:      Rate and Rhythm: Normal rate and regular rhythm.      Heart sounds: Normal heart sounds.   Pulmonary:      Effort: Pulmonary effort is normal. No respiratory distress.      Breath sounds: Normal breath sounds.   Abdominal:      Tenderness: There is abdominal tenderness (throughout lower abdomen, worse in LLQ). There is guarding.      Comments: Bowel sounds absent. Abdomen non-distended. Clean, dry surgical incisions noted without erythema or drainage   Musculoskeletal: Normal range of motion.      Right lower leg: No edema.      Left lower leg: No edema.   Skin:     General: Skin is warm and dry.   Neurological:      Mental Status: He is alert and oriented to person, place, and time.   Psychiatric:         Behavior: Behavior normal.             Significant Labs:   CBC:   Recent Labs   Lab 11/07/20  0304   WBC 16.87*   HGB 14.1   HCT 41.3   *     CMP:   Recent Labs   Lab 11/07/20  0304      K 4.0      CO2 23   *   BUN 18   CREATININE 1.2   CALCIUM 9.6   PROT 8.2   ALBUMIN 3.9   BILITOT 0.3   ALKPHOS 131   AST 50*   *   ANIONGAP 14   EGFRNONAA >60     All pertinent labs within the past 24 hours have been reviewed.    Significant Imaging: I have reviewed and interpreted all pertinent imaging results/findings within the past 24 hours.

## 2020-11-07 NOTE — ED PROVIDER NOTES
"Encounter Date: 11/7/2020       History     Chief Complaint   Patient presents with    Abdominal Pain     intermittent, sharp periumbilical since 2000 last night. Had appendectomy last week, feels like pain is r/t "overdoing it" today. Using IBU and tramadol without relief.      35-year-old male status post an appendectomy on 10/27 and an exploratory laparotomy on 10/29 presents complaining of sudden onset of sharp abdominal pain approximately 7 hr ago.  Patient states it is his lower abdomen but points to his left upper quadrant as the location of the most severe pain.  He denies any associated nausea, vomiting, fever, chills, urinary symptoms or changes in bowel movement.  Patient states he was feeling at his baseline earlier today and did a lot of work around the house.  Patient thinks he may have overdone it.  He reports taking tramadol times to this evening with no relief.  Pain is relieved by leaning forward when sitting and aggravated by laying flat.        Review of patient's allergies indicates:   Allergen Reactions    Grass pollen-june grass standard     Mold      History reviewed. No pertinent past medical history.  Past Surgical History:   Procedure Laterality Date    APPENDECTOMY  29-30Oct20    COLON SURGERY  29-30Oct20    right tibula and fibula retained hardware      right wrist reconstruction of torn ligaments Right      History reviewed. No pertinent family history.  Social History     Tobacco Use    Smoking status: Passive Smoke Exposure - Never Smoker    Smokeless tobacco: Never Used   Substance Use Topics    Alcohol use: Yes     Alcohol/week: 3.0 standard drinks     Types: 3 Cans of beer per week    Drug use: Never     Review of Systems   Constitutional: Negative for chills, diaphoresis, fatigue and fever.   Respiratory: Negative for cough, shortness of breath and stridor.    Cardiovascular: Negative for chest pain and palpitations.   Gastrointestinal: Positive for abdominal pain. " Negative for constipation, diarrhea, nausea and vomiting.   Genitourinary: Negative for dysuria, frequency and hematuria.   Musculoskeletal: Negative for back pain.   Neurological: Negative for dizziness, weakness and headaches.       Physical Exam     Initial Vitals [11/07/20 0214]   BP Pulse Resp Temp SpO2   (!) 142/80 81 18 97.7 °F (36.5 °C) 98 %      MAP       --         Physical Exam    Nursing note and vitals reviewed.  Constitutional: He appears well-developed and well-nourished.   HENT:   Head: Normocephalic and atraumatic.   Right Ear: External ear normal.   Left Ear: External ear normal.   Nose: Nose normal.   Eyes: Conjunctivae and EOM are normal. Right eye exhibits no discharge. Left eye exhibits no discharge.   Neck: Normal range of motion. Neck supple. No JVD present.   Cardiovascular: Normal rate, regular rhythm, normal heart sounds and intact distal pulses.   Pulmonary/Chest: Breath sounds normal. No stridor. No respiratory distress. He has no wheezes. He has no rhonchi. He has no rales.   Abdominal: Bowel sounds are normal. He exhibits no distension and no mass. There is abdominal tenderness. There is no rebound and no guarding.   Diffuse tenderness to palpation, no rebound, no guarding,   Musculoskeletal: Normal range of motion.   Neurological: He is alert and oriented to person, place, and time. GCS score is 15. GCS eye subscore is 4. GCS verbal subscore is 5. GCS motor subscore is 6.   Skin: Skin is warm and dry.   Psychiatric: He has a normal mood and affect. His behavior is normal. Judgment and thought content normal.         ED Course   Procedures  Labs Reviewed   CBC W/ AUTO DIFFERENTIAL - Abnormal; Notable for the following components:       Result Value    WBC 16.87 (*)     Platelets 459 (*)     MPV 8.7 (*)     Immature Granulocytes 0.7 (*)     Gran # (ANC) 13.9 (*)     Immature Grans (Abs) 0.11 (*)     Gran % 82.6 (*)     Lymph % 11.1 (*)     All other components within normal limits    COMPREHENSIVE METABOLIC PANEL - Abnormal; Notable for the following components:    Glucose 140 (*)     AST 50 (*)      (*)     All other components within normal limits   LIPASE   LACTIC ACID, PLASMA   SARS-COV-2 RDRP GENE    Narrative:     This test utilizes isothermal nucleic acid amplification   technology to detect the SARS-CoV-2 RdRp nucleic acid segment.   The analytical sensitivity (limit of detection) is 125 genome   equivalents/mL.   A POSITIVE result implies infection with the SARS-CoV-2 virus;   the patient is presumed to be contagious.     A NEGATIVE result means that SARS-CoV-2 nucleic acids are not   present above the limit of detection. A NEGATIVE result should be   treated as presumptive. It does not rule out the possibility of   COVID-19 and should not be the sole basis for treatment decisions.   If COVID-19 is strongly suspected based on clinical and exposure   history, re-testing using an alternate molecular assay should be   considered.   This test is only for use under the Food and Drug   Administration s Emergency Use Authorization (EUA).   Commercial kits are provided by [employer].   Performance characteristics of the EUA have been independently   verified by [facility] Department of   Pathology and Laboratory Medicine.   _________________________________________________________________   The authorized Fact Sheet for Healthcare Providers and the authorized Fact   Sheet for Patients of the ID NOW COVID-19 are available on the FDA   website:     https://www.fda.gov/media/095553/download  https://www.fda.gov/media/996081/download              Imaging Results           CT Abdomen Pelvis With Contrast (Final result)  Result time 11/07/20 04:40:07    Final result by Gunnar Michele MD (11/07/20 04:40:07)                 Impression:      1.  Focally dilated loops of small bowel within the pelvis with apparent focal transition point in the right mid pelvis concerning  for small bowel obstruction.  There is mild adjacent mesenteric edema.  This is nonspecific in this patient with reported history of recent perforated appendicitis/peritonitis, however component of bowel ischemia is not excluded.    2.  Postoperative changes of recent appendectomy.  There is a small peripherally enhancing fluid collection within the posterior pelvis which may represent an abscess.  Correlation with any prior outside imaging is advised.    3.  Slight wall thickening of the rectosigmoid colon, favored to be reactive in etiology.    This report was flagged in Epic as abnormal.      Electronically signed by: Gunnar Michele MD  Date:    11/07/2020  Time:    04:40             Narrative:    EXAMINATION:  CT ABDOMEN PELVIS WITH CONTRAST    CLINICAL HISTORY:  Abdominal pain, acute, nonlocalized;    TECHNIQUE:  Low dose axial images, sagittal and coronal reformations were obtained from the lung bases to the pubic symphysis following the IV administration of 75 mL of Omnipaque 350 .  Oral contrast was not given.    COMPARISON:  None.    FINDINGS:  There is mild bibasilar atelectasis at the visualized lung bases.  The visualized portions of the heart appear normal.    The liver is normal in size and attenuation with no focal hepatic abnormality.  The gallbladder shows no evidence of stones or pericholecystic fluid.  There is no intra-or extrahepatic biliary ductal dilatation.    The stomach is mildly distended.  The spleen, pancreas, and adrenal glands appear within normal limits.    The kidneys are normal in size and location and enhance symmetrically.  There is no evidence of hydronephrosis. The urinary bladder is unremarkable. The prostate is unremarkable.    The abdominal aorta is normal in course and caliber without significant atherosclerotic calcifications.    There are focally dilated loops of small bowel within the pelvis measuring up to 3.3 cm in diameter.  There is fecalization of small bowel  material.  There is an apparent focal transition point within the right mid pelvis (for example axial series 2, images 100 through 112).  Findings are concerning for small bowel obstruction.  There is mild adjacent mesenteric edema.  The patient has reportedly undergone recent appendectomy.  There is a small peripherally enhancing collection within the posterior pelvis which could represent a small abscess.  This measures approximately 2.3 x 3.3 cm (for example axial series 2, image 119).  No additional discrete collections identified.  There is slight wall thickening of the rectosigmoid colon which is favored to be reactive.  There is scattered retained stool throughout the colon.  There is no free intraperitoneal air.    Visualized osseous structures demonstrate no acute abnormalities.  There is focal fat stranding within the supraumbilical soft tissues which may relate to recent postoperative change.                                    Additional MDM:   Comments: 3:06 AM  Will obtain labs and CT of the abdomen pelvis for further evaluation.  Will give morphine and reassess.    5:13 AM  Labs significant for leukocytosis with a left shift.  CT of the abdomen pelvis with contrast shows changes concerning for small-bowel obstruction as well as a small fluid collection concerning for a possible abscess.  These findings have been discussed with Dr. Nicholas who recommends antibiotics and admission to the hospitalist service.  Plan has also been discussed with the patient who verbalizes understanding and agreement.  Will give a dose of Zosyn and admitted to the hospitalist service.  .                           Clinical Impression:     ICD-10-CM ICD-9-CM   1. SBO (small bowel obstruction)  K56.609 560.9   2. Post-op pain  G89.18 338.18   3. Abnormal CT of the abdomen  R93.5 793.6                          ED Disposition Condition    Admit                             Maame Pillai MD  11/07/20 0680

## 2020-11-07 NOTE — ED PROVIDER NOTES
"Encounter Date: 11/7/2020    SCRIBE #1 NOTE: I, Nicholas George, am scribing for, and in the presence of, Dr. Cali.       History     Chief Complaint   Patient presents with    Abdominal Pain     intermittent, sharp periumbilical since 2000 last night. Had appendectomy last week, feels like pain is r/t "overdoing it" today. Using IBU and tramadol without relief.      The history is provided by the patient.     Review of patient's allergies indicates:   Allergen Reactions    Grass pollen-june grass standard     Mold      History reviewed. No pertinent past medical history.  Past Surgical History:   Procedure Laterality Date    APPENDECTOMY  29-30Oct20    COLON SURGERY  29-30Oct20    right tibula and fibula retained hardware      right wrist reconstruction of torn ligaments Right      History reviewed. No pertinent family history.  Social History     Tobacco Use    Smoking status: Passive Smoke Exposure - Never Smoker    Smokeless tobacco: Never Used   Substance Use Topics    Alcohol use: Yes     Alcohol/week: 3.0 standard drinks     Types: 3 Cans of beer per week    Drug use: Never     Review of Systems    Physical Exam     Initial Vitals [11/07/20 0214]   BP Pulse Resp Temp SpO2   (!) 142/80 81 18 97.7 °F (36.5 °C) 98 %      MAP       --         Physical Exam    ED Course   Procedures  Labs Reviewed   CBC W/ AUTO DIFFERENTIAL - Abnormal; Notable for the following components:       Result Value    WBC 16.87 (*)     Platelets 459 (*)     MPV 8.7 (*)     Immature Granulocytes 0.7 (*)     Gran # (ANC) 13.9 (*)     Immature Grans (Abs) 0.11 (*)     Gran % 82.6 (*)     Lymph % 11.1 (*)     All other components within normal limits   COMPREHENSIVE METABOLIC PANEL - Abnormal; Notable for the following components:    Glucose 140 (*)     AST 50 (*)      (*)     All other components within normal limits   LIPASE   LACTIC ACID, PLASMA   SARS-COV-2 RDRP GENE    Narrative:     This test utilizes isothermal " nucleic acid amplification   technology to detect the SARS-CoV-2 RdRp nucleic acid segment.   The analytical sensitivity (limit of detection) is 125 genome   equivalents/mL.   A POSITIVE result implies infection with the SARS-CoV-2 virus;   the patient is presumed to be contagious.     A NEGATIVE result means that SARS-CoV-2 nucleic acids are not   present above the limit of detection. A NEGATIVE result should be   treated as presumptive. It does not rule out the possibility of   COVID-19 and should not be the sole basis for treatment decisions.   If COVID-19 is strongly suspected based on clinical and exposure   history, re-testing using an alternate molecular assay should be   considered.   This test is only for use under the Food and Drug   Administration s Emergency Use Authorization (EUA).   Commercial kits are provided by [employer].   Performance characteristics of the EUA have been independently   verified by [facility] Department of   Pathology and Laboratory Medicine.   _________________________________________________________________   The authorized Fact Sheet for Healthcare Providers and the authorized Fact   Sheet for Patients of the ID NOW COVID-19 are available on the FDA   website:     https://www.fda.gov/media/507108/download  https://www.fda.gov/media/793021/download              Imaging Results           CT Abdomen Pelvis With Contrast (Final result)  Result time 11/07/20 04:40:07    Final result by uGnnar Michele MD (11/07/20 04:40:07)                 Impression:      1.  Focally dilated loops of small bowel within the pelvis with apparent focal transition point in the right mid pelvis concerning for small bowel obstruction.  There is mild adjacent mesenteric edema.  This is nonspecific in this patient with reported history of recent perforated appendicitis/peritonitis, however component of bowel ischemia is not excluded.    2.  Postoperative changes of recent  appendectomy.  There is a small peripherally enhancing fluid collection within the posterior pelvis which may represent an abscess.  Correlation with any prior outside imaging is advised.    3.  Slight wall thickening of the rectosigmoid colon, favored to be reactive in etiology.    This report was flagged in Epic as abnormal.      Electronically signed by: Gunnar Michele MD  Date:    11/07/2020  Time:    04:40             Narrative:    EXAMINATION:  CT ABDOMEN PELVIS WITH CONTRAST    CLINICAL HISTORY:  Abdominal pain, acute, nonlocalized;    TECHNIQUE:  Low dose axial images, sagittal and coronal reformations were obtained from the lung bases to the pubic symphysis following the IV administration of 75 mL of Omnipaque 350 .  Oral contrast was not given.    COMPARISON:  None.    FINDINGS:  There is mild bibasilar atelectasis at the visualized lung bases.  The visualized portions of the heart appear normal.    The liver is normal in size and attenuation with no focal hepatic abnormality.  The gallbladder shows no evidence of stones or pericholecystic fluid.  There is no intra-or extrahepatic biliary ductal dilatation.    The stomach is mildly distended.  The spleen, pancreas, and adrenal glands appear within normal limits.    The kidneys are normal in size and location and enhance symmetrically.  There is no evidence of hydronephrosis. The urinary bladder is unremarkable. The prostate is unremarkable.    The abdominal aorta is normal in course and caliber without significant atherosclerotic calcifications.    There are focally dilated loops of small bowel within the pelvis measuring up to 3.3 cm in diameter.  There is fecalization of small bowel material.  There is an apparent focal transition point within the right mid pelvis (for example axial series 2, images 100 through 112).  Findings are concerning for small bowel obstruction.  There is mild adjacent mesenteric edema.  The patient has reportedly undergone  recent appendectomy.  There is a small peripherally enhancing collection within the posterior pelvis which could represent a small abscess.  This measures approximately 2.3 x 3.3 cm (for example axial series 2, image 119).  No additional discrete collections identified.  There is slight wall thickening of the rectosigmoid colon which is favored to be reactive.  There is scattered retained stool throughout the colon.  There is no free intraperitoneal air.    Visualized osseous structures demonstrate no acute abnormalities.  There is focal fat stranding within the supraumbilical soft tissues which may relate to recent postoperative change.                                            Scribe Attestation:   Scribe #1: I performed the above scribed service and the documentation accurately describes the services I performed. I attest to the accuracy of the note.    Attending Attestation:           Physician Attestation for Scribe:  Physician Attestation Statement for Scribe #1: I, Dr. Cali, reviewed documentation, as scribed by Nicholas George in my presence, and it is both accurate and complete.                           Clinical Impression:       ICD-10-CM ICD-9-CM   1. SBO (small bowel obstruction)  K56.609 560.9   2. Post-op pain  G89.18 338.18   3. Abnormal CT of the abdomen  R93.5 793.6                          ED Disposition Condition    Admit

## 2020-11-07 NOTE — ASSESSMENT & PLAN NOTE
- AST/ALT 50/210. ALP, bilirubin WNL  - Records reviewed. Prior LFTs WNL  - Liver normal on imaging  - Monitor for now

## 2020-11-08 LAB
ALBUMIN SERPL BCP-MCNC: 3.3 G/DL (ref 3.5–5.2)
ALP SERPL-CCNC: 119 U/L (ref 55–135)
ALT SERPL W/O P-5'-P-CCNC: 129 U/L (ref 10–44)
ANION GAP SERPL CALC-SCNC: 12 MMOL/L (ref 8–16)
AST SERPL-CCNC: 28 U/L (ref 10–40)
BILIRUB SERPL-MCNC: 0.6 MG/DL (ref 0.1–1)
BUN SERPL-MCNC: 16 MG/DL (ref 6–20)
CALCIUM SERPL-MCNC: 9.1 MG/DL (ref 8.7–10.5)
CHLORIDE SERPL-SCNC: 105 MMOL/L (ref 95–110)
CO2 SERPL-SCNC: 21 MMOL/L (ref 23–29)
CREAT SERPL-MCNC: 1 MG/DL (ref 0.5–1.4)
ERYTHROCYTE [DISTWIDTH] IN BLOOD BY AUTOMATED COUNT: 12.6 % (ref 11.5–14.5)
EST. GFR  (AFRICAN AMERICAN): >60 ML/MIN/1.73 M^2
EST. GFR  (NON AFRICAN AMERICAN): >60 ML/MIN/1.73 M^2
GLUCOSE SERPL-MCNC: 99 MG/DL (ref 70–110)
HCT VFR BLD AUTO: 42.2 % (ref 40–54)
HGB BLD-MCNC: 14.1 G/DL (ref 14–18)
MAGNESIUM SERPL-MCNC: 2 MG/DL (ref 1.6–2.6)
MCH RBC QN AUTO: 31.1 PG (ref 27–31)
MCHC RBC AUTO-ENTMCNC: 33.4 G/DL (ref 32–36)
MCV RBC AUTO: 93 FL (ref 82–98)
PHOSPHATE SERPL-MCNC: 3.6 MG/DL (ref 2.7–4.5)
PLATELET # BLD AUTO: 451 K/UL (ref 150–350)
PMV BLD AUTO: 9 FL (ref 9.2–12.9)
POTASSIUM SERPL-SCNC: 4.6 MMOL/L (ref 3.5–5.1)
PROT SERPL-MCNC: 7.1 G/DL (ref 6–8.4)
RBC # BLD AUTO: 4.53 M/UL (ref 4.6–6.2)
SODIUM SERPL-SCNC: 138 MMOL/L (ref 136–145)
WBC # BLD AUTO: 13.15 K/UL (ref 3.9–12.7)

## 2020-11-08 PROCEDURE — 85027 COMPLETE CBC AUTOMATED: CPT

## 2020-11-08 PROCEDURE — 83735 ASSAY OF MAGNESIUM: CPT

## 2020-11-08 PROCEDURE — 36415 COLL VENOUS BLD VENIPUNCTURE: CPT

## 2020-11-08 PROCEDURE — 84100 ASSAY OF PHOSPHORUS: CPT

## 2020-11-08 PROCEDURE — 80053 COMPREHEN METABOLIC PANEL: CPT

## 2020-11-08 PROCEDURE — 25000003 PHARM REV CODE 250: Performed by: INTERNAL MEDICINE

## 2020-11-08 PROCEDURE — 99233 PR SUBSEQUENT HOSPITAL CARE,LEVL III: ICD-10-PCS | Mod: ,,, | Performed by: INTERNAL MEDICINE

## 2020-11-08 PROCEDURE — 11000001 HC ACUTE MED/SURG PRIVATE ROOM

## 2020-11-08 PROCEDURE — 63600175 PHARM REV CODE 636 W HCPCS: Performed by: INTERNAL MEDICINE

## 2020-11-08 PROCEDURE — 94761 N-INVAS EAR/PLS OXIMETRY MLT: CPT

## 2020-11-08 PROCEDURE — 99233 SBSQ HOSP IP/OBS HIGH 50: CPT | Mod: ,,, | Performed by: INTERNAL MEDICINE

## 2020-11-08 RX ADMIN — HYDROMORPHONE HYDROCHLORIDE 0.5 MG: 1 INJECTION, SOLUTION INTRAMUSCULAR; INTRAVENOUS; SUBCUTANEOUS at 01:11

## 2020-11-08 RX ADMIN — KETOROLAC TROMETHAMINE 15 MG: 30 INJECTION, SOLUTION INTRAMUSCULAR; INTRAVENOUS at 08:11

## 2020-11-08 RX ADMIN — HYDROMORPHONE HYDROCHLORIDE 0.5 MG: 1 INJECTION, SOLUTION INTRAMUSCULAR; INTRAVENOUS; SUBCUTANEOUS at 07:11

## 2020-11-08 RX ADMIN — KETOROLAC TROMETHAMINE 15 MG: 30 INJECTION, SOLUTION INTRAMUSCULAR; INTRAVENOUS at 02:11

## 2020-11-08 RX ADMIN — HYDROMORPHONE HYDROCHLORIDE 0.5 MG: 1 INJECTION, SOLUTION INTRAMUSCULAR; INTRAVENOUS; SUBCUTANEOUS at 03:11

## 2020-11-08 RX ADMIN — PIPERACILLIN AND TAZOBACTAM 4.5 G: 4; .5 INJECTION, POWDER, LYOPHILIZED, FOR SOLUTION INTRAVENOUS; PARENTERAL at 09:11

## 2020-11-08 RX ADMIN — HYDROMORPHONE HYDROCHLORIDE 0.5 MG: 1 INJECTION, SOLUTION INTRAMUSCULAR; INTRAVENOUS; SUBCUTANEOUS at 05:11

## 2020-11-08 RX ADMIN — SODIUM CHLORIDE, SODIUM LACTATE, POTASSIUM CHLORIDE, AND CALCIUM CHLORIDE: .6; .31; .03; .02 INJECTION, SOLUTION INTRAVENOUS at 05:11

## 2020-11-08 RX ADMIN — ONDANSETRON 4 MG: 2 INJECTION INTRAMUSCULAR; INTRAVENOUS at 08:11

## 2020-11-08 RX ADMIN — PIPERACILLIN AND TAZOBACTAM 4.5 G: 4; .5 INJECTION, POWDER, LYOPHILIZED, FOR SOLUTION INTRAVENOUS; PARENTERAL at 01:11

## 2020-11-08 RX ADMIN — HYDROMORPHONE HYDROCHLORIDE 0.5 MG: 1 INJECTION, SOLUTION INTRAMUSCULAR; INTRAVENOUS; SUBCUTANEOUS at 09:11

## 2020-11-08 RX ADMIN — PIPERACILLIN AND TAZOBACTAM 4.5 G: 4; .5 INJECTION, POWDER, LYOPHILIZED, FOR SOLUTION INTRAVENOUS; PARENTERAL at 05:11

## 2020-11-08 RX ADMIN — HYDROMORPHONE HYDROCHLORIDE 0.5 MG: 1 INJECTION, SOLUTION INTRAMUSCULAR; INTRAVENOUS; SUBCUTANEOUS at 11:11

## 2020-11-08 RX ADMIN — SODIUM CHLORIDE, SODIUM LACTATE, POTASSIUM CHLORIDE, AND CALCIUM CHLORIDE: .6; .31; .03; .02 INJECTION, SOLUTION INTRAVENOUS at 04:11

## 2020-11-08 NOTE — PLAN OF CARE
VSS and afebrile, aaox4. NPO status maintained. Pt c/o pain throughout the night. Requesting pain medication q2h. Abx infusing per MAR. Ambulating to bathroom and in room. Able to make needs known. Plan of care reviewed with patient. Purposeful hourly rounding done. Call light at bedside, bed at lowest position, brakes on, non skid socks on. Will continue to monitor.

## 2020-11-08 NOTE — ASSESSMENT & PLAN NOTE
Intraabdominal abscess  - s/p laparoscopic appendectomy for acute gangrenous appendicitis with associated abscess on 10/27/20  - Surgery complicated by peritonitis s/p diagnostic laparoscopy with abdominal washout 10/29/20  - CT abd/pelvis shows SBO with transition point in the R mid pelvis and a fluid collection consistent with abscess  - WBC 16 -> 13 with antibiotics. Continue Zosyn  - NPO, IV fluids  - Ketorolac and dilaudid PRN for pain control  - General surgery following. Discussed with Dr. Nicholas  - Will discuss with IR tomorrow if fluid collection is amenable to drainage

## 2020-11-08 NOTE — PROGRESS NOTES
[facility] Hospital Medicine  Progress Note    Patient Name: Giuseppe Valdez  MRN: [id number]  Patient Class: IP- Inpatient   Admission Date: 11/7/2020  Length of Stay: 1 days  Attending Physician: Flor Galvan MD  Primary Care Provider: Primary Doctor No        Subjective:     Principal Problem:SBO (small bowel obstruction)        HPI:  36 y/o M presents to the ED today complaining of abdominal pain. He was hospitalized in [place] 10/27-11/1 where he was diagnosed with gangrenous appendicitis s/p laparoscopic appendectomy on 10/27 complicated by peritonitis s/p exploratory laparoscopy on 10/29. After discharge he was improving until early this morning when he had sudden onset of severe lower abdominal pain. He denies associated fever, chills, nausea, vomiting or diarrhea. He has been taking augmentin since discharge. He took tramadol for the pain without improvement. Pain was rated 8-9/10 at its worst after tramadol so he presented to the ED for evaluation. His last BM was yesterday. In the ED he was found to have SBO with abscess so referred for admission.    He works as a  but currently on leave due to recent surgery. He denies any medical problems and takes no chronic medications.    Overview/Hospital Course:  Patient admitted with SBO and possible developing abscess. He was started on empiric Zosyn and general surgery was consulted.    Interval History: Abdominal pain better controlled today. 2 episodes of vomiting, + nausea. No BM or flatus.    Review of Systems   Constitutional: Negative for chills and fever.   Respiratory: Negative for shortness of breath.    Cardiovascular: Negative for chest pain.   Gastrointestinal: Positive for abdominal pain, nausea and vomiting.     Objective:     Vital Signs (Most Recent):  Temp: 98.2 °F (36.8 °C) (11/08/20 0746)  Pulse: 79 (11/08/20 0746)  Resp: 16 (11/08/20 0957)  BP: 137/85 (11/08/20 0746)  SpO2: 98 % (11/08/20 0746) Vital Signs (24h  Range):  Temp:  [97.4 °F (36.3 °C)-98.3 °F (36.8 °C)] 98.2 °F (36.8 °C)  Pulse:  [60-79] 79  Resp:  [12-18] 16  SpO2:  [95 %-98 %] 98 %  BP: (112-137)/(72-85) 137/85     Weight: 74.8 kg (165 lb)  Body mass index is 25.84 kg/m².    Intake/Output Summary (Last 24 hours) at 11/8/2020 1156  Last data filed at 11/8/2020 0900  Gross per 24 hour   Intake 0 ml   Output 1000 ml   Net -1000 ml      Physical Exam  Vitals signs and nursing note reviewed.   Constitutional:       General: He is not in acute distress.     Appearance: Normal appearance. He is well-developed.   Cardiovascular:      Rate and Rhythm: Normal rate and regular rhythm.      Heart sounds: Normal heart sounds.   Pulmonary:      Effort: Pulmonary effort is normal. No respiratory distress.      Breath sounds: Normal breath sounds.   Abdominal:      General: There is no distension.      Tenderness: There is abdominal tenderness. There is no guarding or rebound.      Comments: Hypoactive bowel sounds. Tenderness in lower abdomen, mostly LLQ   Musculoskeletal:      Right lower leg: No edema.      Left lower leg: No edema.   Skin:     General: Skin is warm and dry.   Neurological:      Mental Status: He is alert and oriented to person, place, and time. Mental status is at baseline.   Psychiatric:         Behavior: Behavior normal.         Significant Labs: All pertinent labs within the past 24 hours have been reviewed.    Significant Imaging: I have reviewed and interpreted all pertinent imaging results/findings within the past 24 hours.      Assessment/Plan:      * SBO (small bowel obstruction)  Intraabdominal abscess  - s/p laparoscopic appendectomy for acute gangrenous appendicitis with associated abscess on 10/27/20  - Surgery complicated by peritonitis s/p diagnostic laparoscopy with abdominal washout 10/29/20  - CT abd/pelvis shows SBO with transition point in the R mid pelvis and a fluid collection consistent with abscess  - WBC 16 -> 13 with antibiotics.  Continue Zosyn  - NPO, IV fluids  - Ketorolac and dilaudid PRN for pain control  - General surgery following. Discussed with Dr. Nicholas  - Will discuss with IR tomorrow if fluid collection is amenable to drainage    Elevated LFTs  - AST/ALT 50/210. ALP, bilirubin WNL  - Records reviewed. Prior LFTs WNL  - Liver normal on imaging  - LFTs improving      VTE Risk Mitigation (From admission, onward)         Ordered     Place sequential compression device  Until discontinued      11/07/20 0836     IP VTE LOW RISK PATIENT  Once      11/07/20 0836                Discharge Planning   ITALIA:      Code Status: Full Code   Is the patient medically ready for discharge?:     Reason for patient still in hospital (select all that apply): Treatment                     Flor Galvan MD  Department of Hospital Medicine   [facility]

## 2020-11-08 NOTE — PLAN OF CARE
Initial Discharge Planning Assessment:  Patient admitted on: 11/7/2020     Chart reviewed, Care plan discussed with treatment team,  attending Dr. Galvan     PCP updated in Epic: Dr. Tano Benjamin  Pharmacy, updated in Epic: [facility] on [facility], but for this admit would prefer [facility]     DME at home: none      Current dispo: home      Transportation: friend to drive home     Power of  or Living Will: none     Anticipated DC needs from CM perspective:  none     11/08/20 1235   Discharge Assessment   Assessment Type Discharge Planning Assessment   Confirmed/corrected address and phone number on facesheet? Yes   Assessment information obtained from? Patient   Communicated expected length of stay with patient/caregiver yes   Prior to hospitilization cognitive status: Alert/Oriented   Prior to hospitalization functional status: Independent   Current cognitive status: Alert/Oriented   Current Functional Status: Independent   Lives With alone   Able to Return to Prior Arrangements yes   Is patient able to care for self after discharge? Yes   Who are your caregiver(s) and their phone number(s)? dad: Germán Valdez  [phone]   Patient's perception of discharge disposition home or selfcare   Readmission Within the Last 30 Days no previous admission in last 30 days   Patient currently being followed by outpatient case management? No   Patient currently receives any other outside agency services? No   Equipment Currently Used at Home none   Do you have any problems affording any of your prescribed medications? No   Is the patient taking medications as prescribed? yes   Does the patient have transportation home? Yes   Transportation Anticipated family or friend will provide   Does the patient receive services at the [facility]? No   Discharge Plan A Home with family   DME Needed Upon Discharge  none   Patient/Family in Agreement with Plan yes

## 2020-11-08 NOTE — PROGRESS NOTES
Hospital day 2.  Status post laparoscopic appendectomy for ruptured appendix 10/27/2020/status post washout 10/29/2020  Both procedures done in [place]  Diagnosis-ileus, leukocytosis, pelvic fluid collection    PE  Awake and alert  Abdomen-distended, no guarding, incisions clean and dry, hypoactive bowel sounds, no masses    Lab  White blood cell count-13,000, down from 16,000 thousand    Imaging  KUB-some dilated loops of small bowel, air in the colon    Impression/plan  Status post appendectomy for ruptured appendix, ileus versus partial small-bowel obstruction  Some peritoneal fluid and leukocytosis  Will have IR evaluate for possible drainage of pelvic fluid collection

## 2020-11-08 NOTE — HOSPITAL COURSE
Admitted with SBO and possible developing abscess.  Made NPO, started empiric piperacillin tazobactam and IVFs.  General surgery consulted.  Pain was difficult to control, and required multimodal approach.  IR consulted for potential drain placement, but fluid collection was too small.  Repeat CT abd/pelvis showed improvement in fluid collection size with empiric antibiotic therapy.  Bowel function was slow to improve, and he required NGT placement.  Had BM overnight 11/15 - 11/16 and drastic improvement in pain, and NGT removed.  Diet advanced and he tolerated without issue. He is now stable and ready for discharge home today.

## 2020-11-08 NOTE — ASSESSMENT & PLAN NOTE
- AST/ALT 50/210. ALP, bilirubin WNL  - Records reviewed. Prior LFTs WNL  - Liver normal on imaging  - LFTs improving

## 2020-11-08 NOTE — SUBJECTIVE & OBJECTIVE
Interval History: Abdominal pain better controlled today. 2 episodes of vomiting, + nausea. No BM or flatus.    Review of Systems   Constitutional: Negative for chills and fever.   Respiratory: Negative for shortness of breath.    Cardiovascular: Negative for chest pain.   Gastrointestinal: Positive for abdominal pain, nausea and vomiting.     Objective:     Vital Signs (Most Recent):  Temp: 98.2 °F (36.8 °C) (11/08/20 0746)  Pulse: 79 (11/08/20 0746)  Resp: 16 (11/08/20 0957)  BP: 137/85 (11/08/20 0746)  SpO2: 98 % (11/08/20 0746) Vital Signs (24h Range):  Temp:  [97.4 °F (36.3 °C)-98.3 °F (36.8 °C)] 98.2 °F (36.8 °C)  Pulse:  [60-79] 79  Resp:  [12-18] 16  SpO2:  [95 %-98 %] 98 %  BP: (112-137)/(72-85) 137/85     Weight: 74.8 kg (165 lb)  Body mass index is 25.84 kg/m².    Intake/Output Summary (Last 24 hours) at 11/8/2020 1156  Last data filed at 11/8/2020 0900  Gross per 24 hour   Intake 0 ml   Output 1000 ml   Net -1000 ml      Physical Exam  Vitals signs and nursing note reviewed.   Constitutional:       General: He is not in acute distress.     Appearance: Normal appearance. He is well-developed.   Cardiovascular:      Rate and Rhythm: Normal rate and regular rhythm.      Heart sounds: Normal heart sounds.   Pulmonary:      Effort: Pulmonary effort is normal. No respiratory distress.      Breath sounds: Normal breath sounds.   Abdominal:      General: There is no distension.      Tenderness: There is abdominal tenderness. There is no guarding or rebound.      Comments: Hypoactive bowel sounds. Tenderness in lower abdomen, mostly LLQ   Musculoskeletal:      Right lower leg: No edema.      Left lower leg: No edema.   Skin:     General: Skin is warm and dry.   Neurological:      Mental Status: He is alert and oriented to person, place, and time. Mental status is at baseline.   Psychiatric:         Behavior: Behavior normal.         Significant Labs: All pertinent labs within the past 24 hours have been  reviewed.    Significant Imaging: I have reviewed and interpreted all pertinent imaging results/findings within the past 24 hours.

## 2020-11-09 PROBLEM — R79.89 ELEVATED LFTS: Status: RESOLVED | Noted: 2020-11-07 | Resolved: 2020-11-09

## 2020-11-09 LAB
ALBUMIN SERPL BCP-MCNC: 3 G/DL (ref 3.5–5.2)
ANION GAP SERPL CALC-SCNC: 14 MMOL/L (ref 8–16)
BUN SERPL-MCNC: 18 MG/DL (ref 6–20)
CALCIUM SERPL-MCNC: 8.9 MG/DL (ref 8.7–10.5)
CHLORIDE SERPL-SCNC: 104 MMOL/L (ref 95–110)
CO2 SERPL-SCNC: 21 MMOL/L (ref 23–29)
CREAT SERPL-MCNC: 1 MG/DL (ref 0.5–1.4)
ERYTHROCYTE [DISTWIDTH] IN BLOOD BY AUTOMATED COUNT: 12.3 % (ref 11.5–14.5)
EST. GFR  (AFRICAN AMERICAN): >60 ML/MIN/1.73 M^2
EST. GFR  (NON AFRICAN AMERICAN): >60 ML/MIN/1.73 M^2
GLUCOSE SERPL-MCNC: 87 MG/DL (ref 70–110)
HCT VFR BLD AUTO: 40.7 % (ref 40–54)
HGB BLD-MCNC: 13.3 G/DL (ref 14–18)
MAGNESIUM SERPL-MCNC: 1.7 MG/DL (ref 1.6–2.6)
MCH RBC QN AUTO: 30.4 PG (ref 27–31)
MCHC RBC AUTO-ENTMCNC: 32.7 G/DL (ref 32–36)
MCV RBC AUTO: 93 FL (ref 82–98)
PHOSPHATE SERPL-MCNC: 3.4 MG/DL (ref 2.7–4.5)
PLATELET # BLD AUTO: 414 K/UL (ref 150–350)
PMV BLD AUTO: 8.8 FL (ref 9.2–12.9)
POTASSIUM SERPL-SCNC: 4.5 MMOL/L (ref 3.5–5.1)
RBC # BLD AUTO: 4.37 M/UL (ref 4.6–6.2)
SODIUM SERPL-SCNC: 139 MMOL/L (ref 136–145)
WBC # BLD AUTO: 12.47 K/UL (ref 3.9–12.7)

## 2020-11-09 PROCEDURE — 80069 RENAL FUNCTION PANEL: CPT

## 2020-11-09 PROCEDURE — 11000001 HC ACUTE MED/SURG PRIVATE ROOM

## 2020-11-09 PROCEDURE — 83735 ASSAY OF MAGNESIUM: CPT

## 2020-11-09 PROCEDURE — 99233 PR SUBSEQUENT HOSPITAL CARE,LEVL III: ICD-10-PCS | Mod: ,,, | Performed by: INTERNAL MEDICINE

## 2020-11-09 PROCEDURE — 25000003 PHARM REV CODE 250: Performed by: INTERNAL MEDICINE

## 2020-11-09 PROCEDURE — 63600175 PHARM REV CODE 636 W HCPCS: Performed by: INTERNAL MEDICINE

## 2020-11-09 PROCEDURE — 85027 COMPLETE CBC AUTOMATED: CPT

## 2020-11-09 PROCEDURE — 94761 N-INVAS EAR/PLS OXIMETRY MLT: CPT

## 2020-11-09 PROCEDURE — 36415 COLL VENOUS BLD VENIPUNCTURE: CPT

## 2020-11-09 PROCEDURE — 99233 SBSQ HOSP IP/OBS HIGH 50: CPT | Mod: ,,, | Performed by: INTERNAL MEDICINE

## 2020-11-09 RX ORDER — KETOROLAC TROMETHAMINE 30 MG/ML
15 INJECTION, SOLUTION INTRAMUSCULAR; INTRAVENOUS EVERY 6 HOURS
Status: COMPLETED | OUTPATIENT
Start: 2020-11-09 | End: 2020-11-09

## 2020-11-09 RX ORDER — HYDROCODONE BITARTRATE AND ACETAMINOPHEN 5; 325 MG/1; MG/1
1 TABLET ORAL EVERY 4 HOURS PRN
Status: DISCONTINUED | OUTPATIENT
Start: 2020-11-09 | End: 2020-11-17 | Stop reason: HOSPADM

## 2020-11-09 RX ORDER — HYDROMORPHONE HYDROCHLORIDE 1 MG/ML
0.5 INJECTION, SOLUTION INTRAMUSCULAR; INTRAVENOUS; SUBCUTANEOUS
Status: DISCONTINUED | OUTPATIENT
Start: 2020-11-09 | End: 2020-11-17 | Stop reason: HOSPADM

## 2020-11-09 RX ADMIN — ONDANSETRON 4 MG: 2 INJECTION INTRAMUSCULAR; INTRAVENOUS at 08:11

## 2020-11-09 RX ADMIN — KETOROLAC TROMETHAMINE 15 MG: 30 INJECTION, SOLUTION INTRAMUSCULAR; INTRAVENOUS at 12:11

## 2020-11-09 RX ADMIN — PIPERACILLIN AND TAZOBACTAM 4.5 G: 4; .5 INJECTION, POWDER, LYOPHILIZED, FOR SOLUTION INTRAVENOUS; PARENTERAL at 05:11

## 2020-11-09 RX ADMIN — KETOROLAC TROMETHAMINE 15 MG: 30 INJECTION, SOLUTION INTRAMUSCULAR; INTRAVENOUS at 06:11

## 2020-11-09 RX ADMIN — HYDROCODONE BITARTRATE AND ACETAMINOPHEN 1 TABLET: 5; 325 TABLET ORAL at 02:11

## 2020-11-09 RX ADMIN — KETOROLAC TROMETHAMINE 15 MG: 30 INJECTION, SOLUTION INTRAMUSCULAR; INTRAVENOUS at 04:11

## 2020-11-09 RX ADMIN — HYDROMORPHONE HYDROCHLORIDE 0.5 MG: 1 INJECTION, SOLUTION INTRAMUSCULAR; INTRAVENOUS; SUBCUTANEOUS at 08:11

## 2020-11-09 RX ADMIN — PIPERACILLIN AND TAZOBACTAM 4.5 G: 4; .5 INJECTION, POWDER, LYOPHILIZED, FOR SOLUTION INTRAVENOUS; PARENTERAL at 02:11

## 2020-11-09 RX ADMIN — HYDROMORPHONE HYDROCHLORIDE 0.5 MG: 1 INJECTION, SOLUTION INTRAMUSCULAR; INTRAVENOUS; SUBCUTANEOUS at 05:11

## 2020-11-09 RX ADMIN — PIPERACILLIN AND TAZOBACTAM 4.5 G: 4; .5 INJECTION, POWDER, LYOPHILIZED, FOR SOLUTION INTRAVENOUS; PARENTERAL at 10:11

## 2020-11-09 RX ADMIN — HYDROCODONE BITARTRATE AND ACETAMINOPHEN 1 TABLET: 5; 325 TABLET ORAL at 06:11

## 2020-11-09 RX ADMIN — HYDROMORPHONE HYDROCHLORIDE 0.5 MG: 1 INJECTION, SOLUTION INTRAMUSCULAR; INTRAVENOUS; SUBCUTANEOUS at 03:11

## 2020-11-09 RX ADMIN — HYDROCODONE BITARTRATE AND ACETAMINOPHEN 1 TABLET: 5; 325 TABLET ORAL at 09:11

## 2020-11-09 RX ADMIN — SODIUM CHLORIDE, SODIUM LACTATE, POTASSIUM CHLORIDE, AND CALCIUM CHLORIDE: .6; .31; .03; .02 INJECTION, SOLUTION INTRAVENOUS at 04:11

## 2020-11-09 RX ADMIN — SODIUM CHLORIDE, SODIUM LACTATE, POTASSIUM CHLORIDE, AND CALCIUM CHLORIDE: .6; .31; .03; .02 INJECTION, SOLUTION INTRAVENOUS at 03:11

## 2020-11-09 RX ADMIN — HYDROMORPHONE HYDROCHLORIDE 0.5 MG: 1 INJECTION, SOLUTION INTRAMUSCULAR; INTRAVENOUS; SUBCUTANEOUS at 01:11

## 2020-11-09 RX ADMIN — HYDROCODONE BITARTRATE AND ACETAMINOPHEN 1 TABLET: 5; 325 TABLET ORAL at 10:11

## 2020-11-09 NOTE — SUBJECTIVE & OBJECTIVE
Interval History: Patient complains of uncontrolled abdominal pain although he is getting dilaudid every 2 hours. Rated 8/10 currently. + flatus but no BM    Review of Systems   Constitutional: Negative for chills and fever.   Respiratory: Negative for shortness of breath.    Cardiovascular: Negative for chest pain.   Gastrointestinal: Positive for abdominal pain and nausea. Negative for vomiting.     Objective:     Vital Signs (Most Recent):  Temp: 98.3 °F (36.8 °C) (11/09/20 0802)  Pulse: 78 (11/09/20 0802)  Resp: 16 (11/09/20 0802)  BP: 135/82 (11/09/20 0802)  SpO2: 96 % (11/09/20 0802) Vital Signs (24h Range):  Temp:  [98.1 °F (36.7 °C)-98.7 °F (37.1 °C)] 98.3 °F (36.8 °C)  Pulse:  [70-89] 78  Resp:  [15-18] 16  SpO2:  [94 %-98 %] 96 %  BP: (111-138)/(67-82) 135/82     Weight: 74.8 kg (165 lb)  Body mass index is 25.84 kg/m².    Intake/Output Summary (Last 24 hours) at 11/9/2020 0841  Last data filed at 11/8/2020 0900  Gross per 24 hour   Intake --   Output 500 ml   Net -500 ml      Physical Exam  Vitals signs and nursing note reviewed.   Constitutional:       General: He is not in acute distress.     Appearance: Normal appearance. He is well-developed.   Cardiovascular:      Rate and Rhythm: Normal rate and regular rhythm.      Heart sounds: Normal heart sounds.   Pulmonary:      Effort: Pulmonary effort is normal. No respiratory distress.      Breath sounds: Normal breath sounds.   Abdominal:      Tenderness: There is abdominal tenderness.      Comments: Hypoactive bowel sounds. Tenderness with deep palpation in lower abdomen   Musculoskeletal:      Right lower leg: No edema.      Left lower leg: No edema.   Skin:     General: Skin is warm and dry.   Neurological:      Mental Status: He is alert and oriented to person, place, and time. Mental status is at baseline.         Significant Labs: All pertinent labs within the past 24 hours have been reviewed.    Significant Imaging: I have reviewed and interpreted  all pertinent imaging results/findings within the past 24 hours.

## 2020-11-09 NOTE — PROGRESS NOTES
[facility] Hospital Medicine  Progress Note    Patient Name: Giuseppe Valdez  MRN: [id number]  Patient Class: IP- Inpatient   Admission Date: 11/7/2020  Length of Stay: 2 days  Attending Physician: Flor Galvan MD  Primary Care Provider: Tano Benjamin MD        Subjective:     Principal Problem:SBO (small bowel obstruction)        HPI:  34 y/o M presents to the ED today complaining of abdominal pain. He was hospitalized in [place] 10/27-11/1 where he was diagnosed with gangrenous appendicitis s/p laparoscopic appendectomy on 10/27 complicated by peritonitis s/p exploratory laparoscopy on 10/29. After discharge he was improving until early this morning when he had sudden onset of severe lower abdominal pain. He denies associated fever, chills, nausea, vomiting or diarrhea. He has been taking augmentin since discharge. He took tramadol for the pain without improvement. Pain was rated 8-9/10 at its worst after tramadol so he presented to the ED for evaluation. His last BM was yesterday. In the ED he was found to have SBO with abscess so referred for admission.    He works as a  but currently on leave due to recent surgery. He denies any medical problems and takes no chronic medications.    Overview/Hospital Course:  Patient admitted with SBO and possible developing abscess. He was made NPO and started on empiric Zosyn and IVF. General surgery was consulted. His pain was difficult to control with toradol and dilaudid. Discussed drain placement with IR but fluid collection was too small to attempt.    Interval History: Patient complains of uncontrolled abdominal pain although he is getting dilaudid every 2 hours. Rated 8/10 currently. + flatus but no BM    Review of Systems   Constitutional: Negative for chills and fever.   Respiratory: Negative for shortness of breath.    Cardiovascular: Negative for chest pain.   Gastrointestinal: Positive for abdominal pain and nausea. Negative for  vomiting.     Objective:     Vital Signs (Most Recent):  Temp: 98.3 °F (36.8 °C) (11/09/20 0802)  Pulse: 78 (11/09/20 0802)  Resp: 16 (11/09/20 0802)  BP: 135/82 (11/09/20 0802)  SpO2: 96 % (11/09/20 0802) Vital Signs (24h Range):  Temp:  [98.1 °F (36.7 °C)-98.7 °F (37.1 °C)] 98.3 °F (36.8 °C)  Pulse:  [70-89] 78  Resp:  [15-18] 16  SpO2:  [94 %-98 %] 96 %  BP: (111-138)/(67-82) 135/82     Weight: 74.8 kg (165 lb)  Body mass index is 25.84 kg/m².    Intake/Output Summary (Last 24 hours) at 11/9/2020 0841  Last data filed at 11/8/2020 0900  Gross per 24 hour   Intake --   Output 500 ml   Net -500 ml      Physical Exam  Vitals signs and nursing note reviewed.   Constitutional:       General: He is not in acute distress.     Appearance: Normal appearance. He is well-developed.   Cardiovascular:      Rate and Rhythm: Normal rate and regular rhythm.      Heart sounds: Normal heart sounds.   Pulmonary:      Effort: Pulmonary effort is normal. No respiratory distress.      Breath sounds: Normal breath sounds.   Abdominal:      Tenderness: There is abdominal tenderness.      Comments: Hypoactive bowel sounds. Tenderness with deep palpation in lower abdomen   Musculoskeletal:      Right lower leg: No edema.      Left lower leg: No edema.   Skin:     General: Skin is warm and dry.   Neurological:      Mental Status: He is alert and oriented to person, place, and time. Mental status is at baseline.         Significant Labs: All pertinent labs within the past 24 hours have been reviewed.    Significant Imaging: I have reviewed and interpreted all pertinent imaging results/findings within the past 24 hours.      Assessment/Plan:      * SBO (small bowel obstruction)  Intraabdominal abscess  - s/p laparoscopic appendectomy for acute gangrenous appendicitis with associated abscess on 10/27/20  - Surgery complicated by peritonitis s/p diagnostic laparoscopy with abdominal washout 10/29/20  - CT abd/pelvis shows SBO with transition  point in the R mid pelvis and a fluid collection, ? developing abscess  - WBC 16 -> 12 with antibiotics. Continue Zosyn  - Continue NPO, IV fluids  - Pain remains uncontrolled. Will schedule ketorolac 15 mg IV q6h x 1 day  - Add norco and continue dilaudid PRN for pain control  - General surgery following. Discussed with Dr. Nicholas  - Discussed with IR, Dr. Moon. Fluid collection too small for drain placement. Recommends following CT to see if collection is improving with IV antibiotics    VTE Risk Mitigation (From admission, onward)         Ordered     Place sequential compression device  Until discontinued      11/07/20 0836     IP VTE LOW RISK PATIENT  Once      11/07/20 0836                Discharge Planning   ITALIA:      Code Status: Full Code   Is the patient medically ready for discharge?:     Reason for patient still in hospital (select all that apply): Treatment  Discharge Plan A: Home with family                  Flor Galvan MD  Department of Hospital Medicine   [facility]

## 2020-11-09 NOTE — PLAN OF CARE
Fall precaution maintained thru out shift.  Pain managed with pain medication, No sign of infection noted.  No sign of distress noted.  Bed lock and at lowest position.  Side rails up x2, call light within reached.  Will continue to monitor.  CARY Feliz

## 2020-11-09 NOTE — PLAN OF CARE
No significant events overnight. Remains free from fall, injury, skin breakdown. VSS on RA throughout the night. Positions self-independently. Pain somewhat controled w/ IV meds. NPO. IVF and ABX infusing as ordered. Pt ambulated in the halls. All alarms active and auduble. SCDs and non skid socks in place. Plan of care reviewed w/ pt and all questions answered. Bed locked and in lowest position. Call light w/I reach. No needs at this time. Purposeful hourly rounding. Will continue to monitor.

## 2020-11-09 NOTE — CONSULTS
Imaging and history reviewed.    There is a small collection in the deep pelvis measuring 3.3 cm max diameter.  Due to small size and location deep in the pelvis, would defer drain placement currently.  Would repeat imaging in a few more days to eval for change.  IF the collection enlarges or does not respond to current treatment, it may be worthwhile to attempt drainage in the future.

## 2020-11-09 NOTE — ASSESSMENT & PLAN NOTE
Intraabdominal abscess  - s/p laparoscopic appendectomy for acute gangrenous appendicitis with associated abscess on 10/27/20  - Surgery complicated by peritonitis s/p diagnostic laparoscopy with abdominal washout 10/29/20  - CT abd/pelvis shows SBO with transition point in the R mid pelvis and a fluid collection, ? developing abscess  - WBC 16 -> 12 with antibiotics. Continue Zosyn  - Continue NPO, IV fluids  - Pain remains uncontrolled. Will schedule ketorolac 15 mg IV q6h x 1 day  - Add norco and continue dilaudid PRN for pain control  - General surgery following. Discussed with Dr. Nicholas  - Discussed with IR, Dr. Moon. Fluid collection too small for drain placement. Recommends following CT to see if collection is improving with IV antibiotics

## 2020-11-09 NOTE — PROGRESS NOTES
Afebrile  Vital signs stable  Passing flatus  No BM  Abdomen-soft, incisions clean and dry, mild distention, positive bowel sounds    Lab  White blood cell count within normal limits    Impression/plan  Ileus resolving, will try clear liquids

## 2020-11-10 LAB
ALBUMIN SERPL BCP-MCNC: 2.7 G/DL (ref 3.5–5.2)
ANION GAP SERPL CALC-SCNC: 12 MMOL/L (ref 8–16)
BUN SERPL-MCNC: 13 MG/DL (ref 6–20)
CALCIUM SERPL-MCNC: 8.6 MG/DL (ref 8.7–10.5)
CHLORIDE SERPL-SCNC: 104 MMOL/L (ref 95–110)
CO2 SERPL-SCNC: 23 MMOL/L (ref 23–29)
CREAT SERPL-MCNC: 0.9 MG/DL (ref 0.5–1.4)
ERYTHROCYTE [DISTWIDTH] IN BLOOD BY AUTOMATED COUNT: 12 % (ref 11.5–14.5)
EST. GFR  (AFRICAN AMERICAN): >60 ML/MIN/1.73 M^2
EST. GFR  (NON AFRICAN AMERICAN): >60 ML/MIN/1.73 M^2
GLUCOSE SERPL-MCNC: 82 MG/DL (ref 70–110)
HCT VFR BLD AUTO: 35.8 % (ref 40–54)
HGB BLD-MCNC: 11.9 G/DL (ref 14–18)
MAGNESIUM SERPL-MCNC: 1.7 MG/DL (ref 1.6–2.6)
MCH RBC QN AUTO: 30.4 PG (ref 27–31)
MCHC RBC AUTO-ENTMCNC: 33.2 G/DL (ref 32–36)
MCV RBC AUTO: 92 FL (ref 82–98)
PHOSPHATE SERPL-MCNC: 3.4 MG/DL (ref 2.7–4.5)
PLATELET # BLD AUTO: 411 K/UL (ref 150–350)
PMV BLD AUTO: 8.8 FL (ref 9.2–12.9)
POTASSIUM SERPL-SCNC: 4 MMOL/L (ref 3.5–5.1)
RBC # BLD AUTO: 3.91 M/UL (ref 4.6–6.2)
SODIUM SERPL-SCNC: 139 MMOL/L (ref 136–145)
WBC # BLD AUTO: 8.46 K/UL (ref 3.9–12.7)

## 2020-11-10 PROCEDURE — 80069 RENAL FUNCTION PANEL: CPT

## 2020-11-10 PROCEDURE — 36415 COLL VENOUS BLD VENIPUNCTURE: CPT

## 2020-11-10 PROCEDURE — 63600175 PHARM REV CODE 636 W HCPCS: Performed by: NURSE PRACTITIONER

## 2020-11-10 PROCEDURE — 11000001 HC ACUTE MED/SURG PRIVATE ROOM

## 2020-11-10 PROCEDURE — 85027 COMPLETE CBC AUTOMATED: CPT

## 2020-11-10 PROCEDURE — 25000003 PHARM REV CODE 250: Performed by: INTERNAL MEDICINE

## 2020-11-10 PROCEDURE — 99233 PR SUBSEQUENT HOSPITAL CARE,LEVL III: ICD-10-PCS | Mod: ,,, | Performed by: INTERNAL MEDICINE

## 2020-11-10 PROCEDURE — 99233 SBSQ HOSP IP/OBS HIGH 50: CPT | Mod: ,,, | Performed by: INTERNAL MEDICINE

## 2020-11-10 PROCEDURE — 63600175 PHARM REV CODE 636 W HCPCS: Performed by: INTERNAL MEDICINE

## 2020-11-10 PROCEDURE — 94761 N-INVAS EAR/PLS OXIMETRY MLT: CPT

## 2020-11-10 PROCEDURE — 83735 ASSAY OF MAGNESIUM: CPT

## 2020-11-10 RX ORDER — KETOROLAC TROMETHAMINE 30 MG/ML
15 INJECTION, SOLUTION INTRAMUSCULAR; INTRAVENOUS EVERY 6 HOURS
Status: COMPLETED | OUTPATIENT
Start: 2020-11-10 | End: 2020-11-12

## 2020-11-10 RX ADMIN — KETOROLAC TROMETHAMINE 15 MG: 30 INJECTION, SOLUTION INTRAMUSCULAR; INTRAVENOUS at 05:11

## 2020-11-10 RX ADMIN — KETOROLAC TROMETHAMINE 15 MG: 30 INJECTION, SOLUTION INTRAMUSCULAR; INTRAVENOUS at 11:11

## 2020-11-10 RX ADMIN — PIPERACILLIN AND TAZOBACTAM 4.5 G: 4; .5 INJECTION, POWDER, LYOPHILIZED, FOR SOLUTION INTRAVENOUS; PARENTERAL at 02:11

## 2020-11-10 RX ADMIN — HYDROCODONE BITARTRATE AND ACETAMINOPHEN 1 TABLET: 5; 325 TABLET ORAL at 12:11

## 2020-11-10 RX ADMIN — HYDROCODONE BITARTRATE AND ACETAMINOPHEN 1 TABLET: 5; 325 TABLET ORAL at 03:11

## 2020-11-10 RX ADMIN — KETOROLAC TROMETHAMINE 15 MG: 30 INJECTION, SOLUTION INTRAMUSCULAR; INTRAVENOUS at 06:11

## 2020-11-10 RX ADMIN — HYDROCODONE BITARTRATE AND ACETAMINOPHEN 1 TABLET: 5; 325 TABLET ORAL at 07:11

## 2020-11-10 RX ADMIN — PIPERACILLIN AND TAZOBACTAM 4.5 G: 4; .5 INJECTION, POWDER, LYOPHILIZED, FOR SOLUTION INTRAVENOUS; PARENTERAL at 10:11

## 2020-11-10 RX ADMIN — KETOROLAC TROMETHAMINE 15 MG: 30 INJECTION, SOLUTION INTRAMUSCULAR; INTRAVENOUS at 12:11

## 2020-11-10 RX ADMIN — SODIUM CHLORIDE, SODIUM LACTATE, POTASSIUM CHLORIDE, AND CALCIUM CHLORIDE: .6; .31; .03; .02 INJECTION, SOLUTION INTRAVENOUS at 11:11

## 2020-11-10 RX ADMIN — PIPERACILLIN AND TAZOBACTAM 4.5 G: 4; .5 INJECTION, POWDER, LYOPHILIZED, FOR SOLUTION INTRAVENOUS; PARENTERAL at 06:11

## 2020-11-10 RX ADMIN — SODIUM CHLORIDE, SODIUM LACTATE, POTASSIUM CHLORIDE, AND CALCIUM CHLORIDE: .6; .31; .03; .02 INJECTION, SOLUTION INTRAVENOUS at 02:11

## 2020-11-10 NOTE — PLAN OF CARE
VSS and afebrile, aaox4. Clear liquid diet tolerated well. Better pain control with PO prn pain medication. Resting throughout the night. Abx infusing per MAR. Ambulating to bathroom and in room. Able to make needs known. Plan of care reviewed with patient. Purposeful hourly rounding done. Call light at bedside, bed at lowest position, brakes on, non skid socks on. Will continue to monitor.

## 2020-11-10 NOTE — PLAN OF CARE
Pt remains free from injury.  Ambulating without difficulty.  Marilou clear liquids.  No nausea.  Reports abdominal fullness/gas discomfort.  IVF & IVAB continue.  No signs infection.  Afebrile.  Pain controlled with toradol and po pain meds

## 2020-11-10 NOTE — PROGRESS NOTES
[facility] Hospital Medicine  Progress Note    Patient Name: Giuseppe Valdez  MRN: [id number]  Patient Class: IP- Inpatient   Admission Date: 11/7/2020  Length of Stay: 3 days  Attending Physician: SHEA Loaiza MD  Primary Care Provider: Tano Benjamin MD        Subjective:     Principal Problem:SBO (small bowel obstruction)        HPI:  34 y/o M presents to the ED today complaining of abdominal pain. He was hospitalized in [place] 10/27-11/1 where he was diagnosed with gangrenous appendicitis s/p laparoscopic appendectomy on 10/27 complicated by peritonitis s/p exploratory laparoscopy on 10/29. After discharge he was improving until early this morning when he had sudden onset of severe lower abdominal pain. He denies associated fever, chills, nausea, vomiting or diarrhea. He has been taking augmentin since discharge. He took tramadol for the pain without improvement. Pain was rated 8-9/10 at its worst after tramadol so he presented to the ED for evaluation. His last BM was yesterday. In the ED he was found to have SBO with abscess so referred for admission.    He works as a  but currently on leave due to recent surgery. He denies any medical problems and takes no chronic medications.    Overview/Hospital Course:  Patient admitted with SBO and possible developing abscess. He was made NPO and started on empiric Zosyn and IVF. General surgery was consulted. His pain was difficult to control with toradol and dilaudid. Discussed drain placement with IR but fluid collection was too small to attempt.    Interval History: No acute events overnight. +flatus, no BM. No new concerns at this time.    Review of Systems   Constitutional: Negative for chills and fever.   Respiratory: Negative for cough and shortness of breath.    Cardiovascular: Negative for chest pain and palpitations.   Gastrointestinal: Positive for abdominal pain and nausea. Negative for vomiting.     Objective:     Vital Signs  (Most Recent):  Temp: 98.4 °F (36.9 °C) (11/10/20 1921)  Pulse: 60 (11/10/20 1921)  Resp: 18 (11/10/20 1922)  BP: 135/83 (11/10/20 1921)  SpO2: 98 % (11/10/20 1940) Vital Signs (24h Range):  Temp:  [97.5 °F (36.4 °C)-98.6 °F (37 °C)] 98.4 °F (36.9 °C)  Pulse:  [60-77] 60  Resp:  [8-18] 18  SpO2:  [95 %-98 %] 98 %  BP: (122-139)/(68-83) 135/83     Weight: 74.8 kg (165 lb)  Body mass index is 25.84 kg/m².    Intake/Output Summary (Last 24 hours) at 11/10/2020 1956  Last data filed at 11/10/2020 1800  Gross per 24 hour   Intake 2440 ml   Output --   Net 2440 ml      Physical Exam  Vitals signs and nursing note reviewed.   Constitutional:       General: He is not in acute distress.     Appearance: He is well-developed.   HENT:      Head: Normocephalic and atraumatic.   Eyes:      General:         Right eye: No discharge.         Left eye: No discharge.      Conjunctiva/sclera: Conjunctivae normal.   Cardiovascular:      Rate and Rhythm: Normal rate.      Pulses: Normal pulses.   Pulmonary:      Effort: Pulmonary effort is normal. No respiratory distress.   Abdominal:      General: Bowel sounds are decreased.      Palpations: Abdomen is soft.      Tenderness: There is abdominal tenderness.   Musculoskeletal: Normal range of motion.      Right lower leg: No edema.      Left lower leg: No edema.   Skin:     General: Skin is warm and dry.   Neurological:      Mental Status: He is alert and oriented to person, place, and time.       Significant Labs:   CBC:  Recent Labs   Lab 11/08/20  0615 11/09/20  0542 11/10/20  0543   WBC 13.15* 12.47 8.46   HGB 14.1 13.3* 11.9*   HCT 42.2 40.7 35.8*   * 414* 411*      BMP:  Recent Labs   Lab 11/08/20  0615 11/09/20  0542 11/10/20  0543    139 139   K 4.6 4.5 4.0    104 104   CO2 21* 21* 23   BUN 16 18 13   CREATININE 1.0 1.0 0.9   GLU 99 87 82   CALCIUM 9.1 8.9 8.6*   MG 2.0 1.7 1.7   PHOS 3.6 3.4 3.4     Significant Imaging:   No new imaging this  morning.      Assessment/Plan:      * SBO (small bowel obstruction)  Intraabdominal abscess  - s/p laparoscopic appendectomy for acute gangrenous appendicitis with associated abscess on 10/27/20  - Surgery complicated by peritonitis s/p diagnostic laparoscopy with abdominal washout 10/29/20  - CT abd/pelvis shows SBO with transition point in the R mid pelvis and a fluid collection, ? developing abscess  - WBC 16 -> 12 with antibiotics. Continue Zosyn  - Continue NPO, IV fluids  - Pain better ncontrolled. Will schedule ketorolac 15 mg IV q6h x 1 day  - Continue norco, dilaudid PRN for pain control  - Appreciate general surgery assistance.  - IR consulted; recommended follow-up CT to determine for improvement.    VTE Risk Mitigation (From admission, onward)         Ordered     Place sequential compression device  Until discontinued      11/07/20 0836     IP VTE LOW RISK PATIENT  Once      11/07/20 0836                Discharge Planning   ITALIA: 11/10/2020     Code Status: Full Code   Is the patient medically ready for discharge?:     Reason for patient still in hospital (select all that apply): Treatment  Discharge Plan A: Home with family                  D Orne Loaiza MD  Department of Hospital Medicine   [facility]

## 2020-11-10 NOTE — PROGRESS NOTES
Patient pain better controlled this shift with medication adjustments. VSS. Patient advanced to clear liquid diet and tolerating well. Patient ambulated independently in room and choen and remained free of falls. Purposeful hourly rounding completed.

## 2020-11-10 NOTE — NURSING
Ambulating in hallway.  Marilou well.  Marilou clear liquids.  Reports fullness/gas discomfort in abdomen. Encouraged to ambulate frequently.  No requests at this time.

## 2020-11-11 LAB
ALBUMIN SERPL BCP-MCNC: 2.9 G/DL (ref 3.5–5.2)
ANION GAP SERPL CALC-SCNC: 15 MMOL/L (ref 8–16)
BUN SERPL-MCNC: 8 MG/DL (ref 6–20)
CALCIUM SERPL-MCNC: 8.6 MG/DL (ref 8.7–10.5)
CHLORIDE SERPL-SCNC: 103 MMOL/L (ref 95–110)
CO2 SERPL-SCNC: 21 MMOL/L (ref 23–29)
CREAT SERPL-MCNC: 0.9 MG/DL (ref 0.5–1.4)
ERYTHROCYTE [DISTWIDTH] IN BLOOD BY AUTOMATED COUNT: 11.7 % (ref 11.5–14.5)
EST. GFR  (AFRICAN AMERICAN): >60 ML/MIN/1.73 M^2
EST. GFR  (NON AFRICAN AMERICAN): >60 ML/MIN/1.73 M^2
GLUCOSE SERPL-MCNC: 87 MG/DL (ref 70–110)
HCT VFR BLD AUTO: 36.4 % (ref 40–54)
HGB BLD-MCNC: 12.6 G/DL (ref 14–18)
MAGNESIUM SERPL-MCNC: 1.6 MG/DL (ref 1.6–2.6)
MCH RBC QN AUTO: 31.1 PG (ref 27–31)
MCHC RBC AUTO-ENTMCNC: 34.6 G/DL (ref 32–36)
MCV RBC AUTO: 90 FL (ref 82–98)
PHOSPHATE SERPL-MCNC: 3.7 MG/DL (ref 2.7–4.5)
PLATELET # BLD AUTO: 421 K/UL (ref 150–350)
PMV BLD AUTO: 9 FL (ref 9.2–12.9)
POTASSIUM SERPL-SCNC: 3.8 MMOL/L (ref 3.5–5.1)
RBC # BLD AUTO: 4.05 M/UL (ref 4.6–6.2)
SODIUM SERPL-SCNC: 139 MMOL/L (ref 136–145)
WBC # BLD AUTO: 7.8 K/UL (ref 3.9–12.7)

## 2020-11-11 PROCEDURE — 94761 N-INVAS EAR/PLS OXIMETRY MLT: CPT

## 2020-11-11 PROCEDURE — 99233 SBSQ HOSP IP/OBS HIGH 50: CPT | Mod: ,,, | Performed by: INTERNAL MEDICINE

## 2020-11-11 PROCEDURE — 63600175 PHARM REV CODE 636 W HCPCS: Performed by: INTERNAL MEDICINE

## 2020-11-11 PROCEDURE — 25000003 PHARM REV CODE 250: Performed by: INTERNAL MEDICINE

## 2020-11-11 PROCEDURE — 63600175 PHARM REV CODE 636 W HCPCS: Performed by: NURSE PRACTITIONER

## 2020-11-11 PROCEDURE — 85027 COMPLETE CBC AUTOMATED: CPT

## 2020-11-11 PROCEDURE — 11000001 HC ACUTE MED/SURG PRIVATE ROOM

## 2020-11-11 PROCEDURE — 36415 COLL VENOUS BLD VENIPUNCTURE: CPT

## 2020-11-11 PROCEDURE — 99233 PR SUBSEQUENT HOSPITAL CARE,LEVL III: ICD-10-PCS | Mod: ,,, | Performed by: INTERNAL MEDICINE

## 2020-11-11 PROCEDURE — 83735 ASSAY OF MAGNESIUM: CPT

## 2020-11-11 PROCEDURE — 80069 RENAL FUNCTION PANEL: CPT

## 2020-11-11 RX ADMIN — KETOROLAC TROMETHAMINE 15 MG: 30 INJECTION, SOLUTION INTRAMUSCULAR; INTRAVENOUS at 06:11

## 2020-11-11 RX ADMIN — HYDROCODONE BITARTRATE AND ACETAMINOPHEN 1 TABLET: 5; 325 TABLET ORAL at 02:11

## 2020-11-11 RX ADMIN — PIPERACILLIN AND TAZOBACTAM 4.5 G: 4; .5 INJECTION, POWDER, LYOPHILIZED, FOR SOLUTION INTRAVENOUS; PARENTERAL at 05:11

## 2020-11-11 RX ADMIN — SODIUM CHLORIDE, SODIUM LACTATE, POTASSIUM CHLORIDE, AND CALCIUM CHLORIDE: .6; .31; .03; .02 INJECTION, SOLUTION INTRAVENOUS at 09:11

## 2020-11-11 RX ADMIN — PIPERACILLIN AND TAZOBACTAM 4.5 G: 4; .5 INJECTION, POWDER, LYOPHILIZED, FOR SOLUTION INTRAVENOUS; PARENTERAL at 09:11

## 2020-11-11 RX ADMIN — PIPERACILLIN AND TAZOBACTAM 4.5 G: 4; .5 INJECTION, POWDER, LYOPHILIZED, FOR SOLUTION INTRAVENOUS; PARENTERAL at 03:11

## 2020-11-11 RX ADMIN — SODIUM CHLORIDE, SODIUM LACTATE, POTASSIUM CHLORIDE, AND CALCIUM CHLORIDE: .6; .31; .03; .02 INJECTION, SOLUTION INTRAVENOUS at 08:11

## 2020-11-11 RX ADMIN — HYDROCODONE BITARTRATE AND ACETAMINOPHEN 1 TABLET: 5; 325 TABLET ORAL at 06:11

## 2020-11-11 RX ADMIN — KETOROLAC TROMETHAMINE 15 MG: 30 INJECTION, SOLUTION INTRAMUSCULAR; INTRAVENOUS at 05:11

## 2020-11-11 RX ADMIN — HYDROCODONE BITARTRATE AND ACETAMINOPHEN 1 TABLET: 5; 325 TABLET ORAL at 09:11

## 2020-11-11 RX ADMIN — KETOROLAC TROMETHAMINE 15 MG: 30 INJECTION, SOLUTION INTRAMUSCULAR; INTRAVENOUS at 11:11

## 2020-11-11 RX ADMIN — KETOROLAC TROMETHAMINE 15 MG: 30 INJECTION, SOLUTION INTRAMUSCULAR; INTRAVENOUS at 12:11

## 2020-11-11 NOTE — ASSESSMENT & PLAN NOTE
Intraabdominal abscess  - s/p laparoscopic appendectomy for acute gangrenous appendicitis with associated abscess on 10/27/20.  - Surgery complicated by peritonitis s/p diagnostic laparoscopy with abdominal washout 10/29/20.  - CT abd/pelvis showed SBO with transition point in the R mid pelvis and a fluid collection/?developing abscess.  - Continuing empiric piperacillin-tazobactam 4.5g IV q8hr.  - Continuing clear liquids; advance as tolerated. Continuing IVFs with LR.  - Pain control improved; continuing hydrocodone-acetaminophen 5-325mg PO q4hr PRN, hydromorphone 0.5mg IV q3hr PRN for moderate/severe pain respectively.  - Continuing ketorolac 15mg IV q6hr for time being.  - IR consulted; recommended follow-up CT to determine for improvement. Repeat in AM.  - Appreciate general surgery assistance.

## 2020-11-11 NOTE — PROGRESS NOTES
[facility] Hospital Medicine  Progress Note    Patient Name: Giuseppe Valdez  MRN: [id number]  Patient Class: IP- Inpatient   Admission Date: 11/7/2020  Length of Stay: 4 days  Attending Physician: SHEA Loaiza MD  Primary Care Provider: Tano Benjamin MD        Subjective:     Principal Problem:SBO (small bowel obstruction)        HPI:  34 y/o M presents to the ED today complaining of abdominal pain. He was hospitalized in [place] 10/27-11/1 where he was diagnosed with gangrenous appendicitis s/p laparoscopic appendectomy on 10/27 complicated by peritonitis s/p exploratory laparoscopy on 10/29. After discharge he was improving until early this morning when he had sudden onset of severe lower abdominal pain. He denies associated fever, chills, nausea, vomiting or diarrhea. He has been taking augmentin since discharge. He took tramadol for the pain without improvement. Pain was rated 8-9/10 at its worst after tramadol so he presented to the ED for evaluation. His last BM was yesterday. In the ED he was found to have SBO with abscess so referred for admission.    He works as a  but currently on leave due to recent surgery. He denies any medical problems and takes no chronic medications.    Overview/Hospital Course:  Patient admitted with SBO and possible developing abscess. He was made NPO and started on empiric Zosyn and IVF. General surgery was consulted. His pain was difficult to control with toradol and dilaudid. Discussed drain placement with IR but fluid collection was too small to attempt.    Interval History: No acute events overnight. Denies much flatus overnight, still no BM. No new concerns at this time.    Review of Systems   Constitutional: Negative for chills and fever.   Respiratory: Negative for cough and shortness of breath.    Cardiovascular: Negative for chest pain and palpitations.   Gastrointestinal: Positive for abdominal pain and nausea. Negative for vomiting.      Objective:     Vital Signs (Most Recent):  Temp: 98.4 °F (36.9 °C) (11/11/20 1204)  Pulse: 63 (11/11/20 1204)  Resp: 14 (11/11/20 1204)  BP: 139/75 (11/11/20 1204)  SpO2: 97 % (11/11/20 1204) Vital Signs (24h Range):  Temp:  [97.9 °F (36.6 °C)-98.5 °F (36.9 °C)] 98.4 °F (36.9 °C)  Pulse:  [60-68] 63  Resp:  [14-18] 14  SpO2:  [95 %-98 %] 97 %  BP: (122-145)/(70-84) 139/75     Weight: 74.8 kg (165 lb)  Body mass index is 25.84 kg/m².    Intake/Output Summary (Last 24 hours) at 11/11/2020 1546  Last data filed at 11/11/2020 0548  Gross per 24 hour   Intake 2451.67 ml   Output --   Net 2451.67 ml      Physical Exam  Vitals signs and nursing note reviewed.   Constitutional:       General: He is not in acute distress.     Appearance: He is well-developed.   HENT:      Head: Normocephalic and atraumatic.   Eyes:      General:         Right eye: No discharge.         Left eye: No discharge.      Conjunctiva/sclera: Conjunctivae normal.   Cardiovascular:      Rate and Rhythm: Normal rate.      Pulses: Normal pulses.   Pulmonary:      Effort: Pulmonary effort is normal. No respiratory distress.   Abdominal:      General: Bowel sounds are decreased.      Palpations: Abdomen is soft.      Tenderness: There is abdominal tenderness.   Musculoskeletal: Normal range of motion.      Right lower leg: No edema.      Left lower leg: No edema.   Skin:     General: Skin is warm and dry.   Neurological:      Mental Status: He is alert and oriented to person, place, and time.       Significant Labs:   CBC:  Recent Labs   Lab 11/09/20 0542 11/10/20  0543 11/11/20  0442   WBC 12.47 8.46 7.80   HGB 13.3* 11.9* 12.6*   HCT 40.7 35.8* 36.4*   * 411* 421*      BMP:  Recent Labs   Lab 11/09/20 0542 11/10/20  0543 11/11/20 0442    139 139   K 4.5 4.0 3.8    104 103   CO2 21* 23 21*   BUN 18 13 8   CREATININE 1.0 0.9 0.9   GLU 87 82 87   CALCIUM 8.9 8.6* 8.6*   MG 1.7 1.7 1.6   PHOS 3.4 3.4 3.7     Significant Imaging:    No new imaging this morning.      Assessment/Plan:      * SBO (small bowel obstruction)  Intraabdominal abscess  - s/p laparoscopic appendectomy for acute gangrenous appendicitis with associated abscess on 10/27/20.  - Surgery complicated by peritonitis s/p diagnostic laparoscopy with abdominal washout 10/29/20.  - CT abd/pelvis showed SBO with transition point in the R mid pelvis and a fluid collection/?developing abscess.  - Continuing empiric piperacillin-tazobactam 4.5g IV q8hr.  - Continuing clear liquids; advance as tolerated. Continuing IVFs with LR.  - Pain control improved; continuing hydrocodone-acetaminophen 5-325mg PO q4hr PRN, hydromorphone 0.5mg IV q3hr PRN for moderate/severe pain respectively.  - Continuing ketorolac 15mg IV q6hr for time being.  - IR consulted; recommended follow-up CT to determine for improvement. Repeat in AM.  - Appreciate general surgery assistance.    VTE Risk Mitigation (From admission, onward)         Ordered     Place sequential compression device  Until discontinued      11/07/20 0836     IP VTE LOW RISK PATIENT  Once      11/07/20 0836                Discharge Planning   ITALIA: 11/11/2020     Code Status: Full Code   Is the patient medically ready for discharge?:     Reason for patient still in hospital (select all that apply): Treatment  Discharge Plan A: Home with family        D Oren Loaiza MD  Department of Hospital Medicine   [facility]

## 2020-11-11 NOTE — SUBJECTIVE & OBJECTIVE
Interval History: No acute events overnight. Denies much flatus overnight, still no BM. No new concerns at this time.    Review of Systems   Constitutional: Negative for chills and fever.   Respiratory: Negative for cough and shortness of breath.    Cardiovascular: Negative for chest pain and palpitations.   Gastrointestinal: Positive for abdominal pain and nausea. Negative for vomiting.     Objective:     Vital Signs (Most Recent):  Temp: 98.4 °F (36.9 °C) (11/11/20 1204)  Pulse: 63 (11/11/20 1204)  Resp: 14 (11/11/20 1204)  BP: 139/75 (11/11/20 1204)  SpO2: 97 % (11/11/20 1204) Vital Signs (24h Range):  Temp:  [97.9 °F (36.6 °C)-98.5 °F (36.9 °C)] 98.4 °F (36.9 °C)  Pulse:  [60-68] 63  Resp:  [14-18] 14  SpO2:  [95 %-98 %] 97 %  BP: (122-145)/(70-84) 139/75     Weight: 74.8 kg (165 lb)  Body mass index is 25.84 kg/m².    Intake/Output Summary (Last 24 hours) at 11/11/2020 1546  Last data filed at 11/11/2020 0548  Gross per 24 hour   Intake 2451.67 ml   Output --   Net 2451.67 ml      Physical Exam  Vitals signs and nursing note reviewed.   Constitutional:       General: He is not in acute distress.     Appearance: He is well-developed.   HENT:      Head: Normocephalic and atraumatic.   Eyes:      General:         Right eye: No discharge.         Left eye: No discharge.      Conjunctiva/sclera: Conjunctivae normal.   Cardiovascular:      Rate and Rhythm: Normal rate.      Pulses: Normal pulses.   Pulmonary:      Effort: Pulmonary effort is normal. No respiratory distress.   Abdominal:      General: Bowel sounds are decreased.      Palpations: Abdomen is soft.      Tenderness: There is abdominal tenderness.   Musculoskeletal: Normal range of motion.      Right lower leg: No edema.      Left lower leg: No edema.   Skin:     General: Skin is warm and dry.   Neurological:      Mental Status: He is alert and oriented to person, place, and time.       Significant Labs:   CBC:  Recent Labs   Lab 11/09/20  0542  11/10/20  0543 11/11/20 0442   WBC 12.47 8.46 7.80   HGB 13.3* 11.9* 12.6*   HCT 40.7 35.8* 36.4*   * 411* 421*      BMP:  Recent Labs   Lab 11/09/20  0542 11/10/20  0543 11/11/20 0442    139 139   K 4.5 4.0 3.8    104 103   CO2 21* 23 21*   BUN 18 13 8   CREATININE 1.0 0.9 0.9   GLU 87 82 87   CALCIUM 8.9 8.6* 8.6*   MG 1.7 1.7 1.6   PHOS 3.4 3.4 3.7     Significant Imaging:   No new imaging this morning.

## 2020-11-11 NOTE — ASSESSMENT & PLAN NOTE
Intraabdominal abscess  - s/p laparoscopic appendectomy for acute gangrenous appendicitis with associated abscess on 10/27/20  - Surgery complicated by peritonitis s/p diagnostic laparoscopy with abdominal washout 10/29/20  - CT abd/pelvis shows SBO with transition point in the R mid pelvis and a fluid collection, ? developing abscess  - WBC 16 -> 12 with antibiotics. Continue Zosyn  - Continue NPO, IV fluids  - Pain better ncontrolled. Will schedule ketorolac 15 mg IV q6h x 1 day  - Continue norco, dilaudid PRN for pain control  - Appreciate general surgery assistance.  - IR consulted; recommended follow-up CT to determine for improvement.

## 2020-11-11 NOTE — PLAN OF CARE
Pt remains free from injury or falls. Vital signs stable throughout night on room air. Positions self independently, ambulated in hallway x 1. Pain managed with scheduled IV Toradol and PO medications, no complaints of nausea. LR @ 100 ml/hr. Bed in low locked position and call light within reach. Will continue to monitor.

## 2020-11-11 NOTE — PLAN OF CARE
Remains free from injury.  Pain tolerable.  Abdomen distended and pt reports gas discomfort.  No flatus/BM.  No nausea/vomiting.  Takings sips of clears.  Bowel sounds hypoactive.  IVF and IVAB continue.

## 2020-11-11 NOTE — SUBJECTIVE & OBJECTIVE
Interval History: No acute events overnight. +flatus, no BM. No new concerns at this time.    Review of Systems   Constitutional: Negative for chills and fever.   Respiratory: Negative for cough and shortness of breath.    Cardiovascular: Negative for chest pain and palpitations.   Gastrointestinal: Positive for abdominal pain and nausea. Negative for vomiting.     Objective:     Vital Signs (Most Recent):  Temp: 98.4 °F (36.9 °C) (11/10/20 1921)  Pulse: 60 (11/10/20 1921)  Resp: 18 (11/10/20 1922)  BP: 135/83 (11/10/20 1921)  SpO2: 98 % (11/10/20 1940) Vital Signs (24h Range):  Temp:  [97.5 °F (36.4 °C)-98.6 °F (37 °C)] 98.4 °F (36.9 °C)  Pulse:  [60-77] 60  Resp:  [8-18] 18  SpO2:  [95 %-98 %] 98 %  BP: (122-139)/(68-83) 135/83     Weight: 74.8 kg (165 lb)  Body mass index is 25.84 kg/m².    Intake/Output Summary (Last 24 hours) at 11/10/2020 1956  Last data filed at 11/10/2020 1800  Gross per 24 hour   Intake 2440 ml   Output --   Net 2440 ml      Physical Exam  Vitals signs and nursing note reviewed.   Constitutional:       General: He is not in acute distress.     Appearance: He is well-developed.   HENT:      Head: Normocephalic and atraumatic.   Eyes:      General:         Right eye: No discharge.         Left eye: No discharge.      Conjunctiva/sclera: Conjunctivae normal.   Cardiovascular:      Rate and Rhythm: Normal rate.      Pulses: Normal pulses.   Pulmonary:      Effort: Pulmonary effort is normal. No respiratory distress.   Abdominal:      General: Bowel sounds are decreased.      Palpations: Abdomen is soft.      Tenderness: There is abdominal tenderness.   Musculoskeletal: Normal range of motion.      Right lower leg: No edema.      Left lower leg: No edema.   Skin:     General: Skin is warm and dry.   Neurological:      Mental Status: He is alert and oriented to person, place, and time.       Significant Labs:   CBC:  Recent Labs   Lab 11/08/20  0615 11/09/20  0542 11/10/20  0543   WBC 13.15*  12.47 8.46   HGB 14.1 13.3* 11.9*   HCT 42.2 40.7 35.8*   * 414* 411*      BMP:  Recent Labs   Lab 11/08/20  0615 11/09/20  0542 11/10/20  0543    139 139   K 4.6 4.5 4.0    104 104   CO2 21* 21* 23   BUN 16 18 13   CREATININE 1.0 1.0 0.9   GLU 99 87 82   CALCIUM 9.1 8.9 8.6*   MG 2.0 1.7 1.7   PHOS 3.6 3.4 3.4     Significant Imaging:   No new imaging this morning.

## 2020-11-12 LAB
ALBUMIN SERPL BCP-MCNC: 3.2 G/DL (ref 3.5–5.2)
ANION GAP SERPL CALC-SCNC: 14 MMOL/L (ref 8–16)
BUN SERPL-MCNC: 7 MG/DL (ref 6–20)
CALCIUM SERPL-MCNC: 9 MG/DL (ref 8.7–10.5)
CHLORIDE SERPL-SCNC: 102 MMOL/L (ref 95–110)
CO2 SERPL-SCNC: 20 MMOL/L (ref 23–29)
CREAT SERPL-MCNC: 1 MG/DL (ref 0.5–1.4)
ERYTHROCYTE [DISTWIDTH] IN BLOOD BY AUTOMATED COUNT: 11.6 % (ref 11.5–14.5)
EST. GFR  (AFRICAN AMERICAN): >60 ML/MIN/1.73 M^2
EST. GFR  (NON AFRICAN AMERICAN): >60 ML/MIN/1.73 M^2
GLUCOSE SERPL-MCNC: 87 MG/DL (ref 70–110)
HCT VFR BLD AUTO: 37.9 % (ref 40–54)
HGB BLD-MCNC: 13.2 G/DL (ref 14–18)
MAGNESIUM SERPL-MCNC: 1.6 MG/DL (ref 1.6–2.6)
MCH RBC QN AUTO: 31.2 PG (ref 27–31)
MCHC RBC AUTO-ENTMCNC: 34.8 G/DL (ref 32–36)
MCV RBC AUTO: 90 FL (ref 82–98)
PHOSPHATE SERPL-MCNC: 3.8 MG/DL (ref 2.7–4.5)
PLATELET # BLD AUTO: 449 K/UL (ref 150–350)
PMV BLD AUTO: 9 FL (ref 9.2–12.9)
POTASSIUM SERPL-SCNC: 4.1 MMOL/L (ref 3.5–5.1)
RBC # BLD AUTO: 4.23 M/UL (ref 4.6–6.2)
SODIUM SERPL-SCNC: 136 MMOL/L (ref 136–145)
WBC # BLD AUTO: 10.53 K/UL (ref 3.9–12.7)

## 2020-11-12 PROCEDURE — 63600175 PHARM REV CODE 636 W HCPCS: Performed by: NURSE PRACTITIONER

## 2020-11-12 PROCEDURE — 85027 COMPLETE CBC AUTOMATED: CPT

## 2020-11-12 PROCEDURE — 36415 COLL VENOUS BLD VENIPUNCTURE: CPT

## 2020-11-12 PROCEDURE — 25500020 PHARM REV CODE 255: Performed by: INTERNAL MEDICINE

## 2020-11-12 PROCEDURE — 99233 SBSQ HOSP IP/OBS HIGH 50: CPT | Mod: ,,, | Performed by: INTERNAL MEDICINE

## 2020-11-12 PROCEDURE — 25000003 PHARM REV CODE 250: Performed by: SPECIALIST

## 2020-11-12 PROCEDURE — 25000003 PHARM REV CODE 250: Performed by: INTERNAL MEDICINE

## 2020-11-12 PROCEDURE — 83735 ASSAY OF MAGNESIUM: CPT

## 2020-11-12 PROCEDURE — 11000001 HC ACUTE MED/SURG PRIVATE ROOM

## 2020-11-12 PROCEDURE — 94761 N-INVAS EAR/PLS OXIMETRY MLT: CPT

## 2020-11-12 PROCEDURE — 99233 PR SUBSEQUENT HOSPITAL CARE,LEVL III: ICD-10-PCS | Mod: ,,, | Performed by: INTERNAL MEDICINE

## 2020-11-12 PROCEDURE — 80069 RENAL FUNCTION PANEL: CPT

## 2020-11-12 PROCEDURE — 63600175 PHARM REV CODE 636 W HCPCS: Performed by: INTERNAL MEDICINE

## 2020-11-12 RX ORDER — KETOROLAC TROMETHAMINE 30 MG/ML
15 INJECTION, SOLUTION INTRAMUSCULAR; INTRAVENOUS ONCE
Status: COMPLETED | OUTPATIENT
Start: 2020-11-13 | End: 2020-11-13

## 2020-11-12 RX ORDER — DOCUSATE SODIUM 100 MG/1
100 CAPSULE, LIQUID FILLED ORAL DAILY
Status: DISCONTINUED | OUTPATIENT
Start: 2020-11-12 | End: 2020-11-15

## 2020-11-12 RX ADMIN — PIPERACILLIN AND TAZOBACTAM 4.5 G: 4; .5 INJECTION, POWDER, LYOPHILIZED, FOR SOLUTION INTRAVENOUS; PARENTERAL at 01:11

## 2020-11-12 RX ADMIN — PIPERACILLIN AND TAZOBACTAM 4.5 G: 4; .5 INJECTION, POWDER, LYOPHILIZED, FOR SOLUTION INTRAVENOUS; PARENTERAL at 10:11

## 2020-11-12 RX ADMIN — HYDROCODONE BITARTRATE AND ACETAMINOPHEN 1 TABLET: 5; 325 TABLET ORAL at 02:11

## 2020-11-12 RX ADMIN — PIPERACILLIN AND TAZOBACTAM 4.5 G: 4; .5 INJECTION, POWDER, LYOPHILIZED, FOR SOLUTION INTRAVENOUS; PARENTERAL at 05:11

## 2020-11-12 RX ADMIN — KETOROLAC TROMETHAMINE 15 MG: 30 INJECTION, SOLUTION INTRAMUSCULAR; INTRAVENOUS at 01:11

## 2020-11-12 RX ADMIN — SODIUM CHLORIDE, SODIUM LACTATE, POTASSIUM CHLORIDE, AND CALCIUM CHLORIDE: .6; .31; .03; .02 INJECTION, SOLUTION INTRAVENOUS at 05:11

## 2020-11-12 RX ADMIN — IOHEXOL 75 ML: 350 INJECTION, SOLUTION INTRAVENOUS at 08:11

## 2020-11-12 RX ADMIN — KETOROLAC TROMETHAMINE 15 MG: 30 INJECTION, SOLUTION INTRAMUSCULAR; INTRAVENOUS at 06:11

## 2020-11-12 RX ADMIN — HYDROCODONE BITARTRATE AND ACETAMINOPHEN 1 TABLET: 5; 325 TABLET ORAL at 06:11

## 2020-11-12 RX ADMIN — DOCUSATE SODIUM 100 MG: 100 CAPSULE, LIQUID FILLED ORAL at 01:11

## 2020-11-12 RX ADMIN — KETOROLAC TROMETHAMINE 15 MG: 30 INJECTION, SOLUTION INTRAMUSCULAR; INTRAVENOUS at 05:11

## 2020-11-12 RX ADMIN — SODIUM CHLORIDE, SODIUM LACTATE, POTASSIUM CHLORIDE, AND CALCIUM CHLORIDE: .6; .31; .03; .02 INJECTION, SOLUTION INTRAVENOUS at 04:11

## 2020-11-12 NOTE — PROGRESS NOTES
[facility] Hospital Medicine  Progress Note    Patient Name: Giuseppe Valdez  MRN: [id number]  Patient Class: IP- Inpatient   Admission Date: 11/7/2020  Length of Stay: 5 days  Attending Physician: SHEA Loaiza MD  Primary Care Provider: Tano Benjamin MD        Subjective:     Principal Problem:SBO (small bowel obstruction)        HPI:  36 y/o M presents to the ED today complaining of abdominal pain. He was hospitalized in [place] 10/27-11/1 where he was diagnosed with gangrenous appendicitis s/p laparoscopic appendectomy on 10/27 complicated by peritonitis s/p exploratory laparoscopy on 10/29. After discharge he was improving until early this morning when he had sudden onset of severe lower abdominal pain. He denies associated fever, chills, nausea, vomiting or diarrhea. He has been taking augmentin since discharge. He took tramadol for the pain without improvement. Pain was rated 8-9/10 at its worst after tramadol so he presented to the ED for evaluation. His last BM was yesterday. In the ED he was found to have SBO with abscess so referred for admission.    He works as a  but currently on leave due to recent surgery. He denies any medical problems and takes no chronic medications.    Overview/Hospital Course:  Patient admitted with SBO and possible developing abscess. He was made NPO and started on empiric Zosyn and IVF. General surgery was consulted. His pain was difficult to control with toradol and dilaudid. Discussed drain placement with IR but fluid collection was too small to attempt.    Interval History: No acute events overnight. Reports no flatus overnight, persistent abdominal distention. Some nausea, no vomiting. No other concerns at this time.    Review of Systems   Constitutional: Negative for chills and fever.   Respiratory: Negative for cough and shortness of breath.    Cardiovascular: Negative for chest pain and palpitations.   Gastrointestinal: Positive for abdominal  pain and nausea. Negative for vomiting.     Objective:     Vital Signs (Most Recent):  Temp: 98.3 °F (36.8 °C) (11/12/20 1315)  Pulse: 62 (11/12/20 1315)  Resp: 16 (11/12/20 1315)  BP: 130/75 (11/12/20 1315)  SpO2: 95 % (11/12/20 1315) Vital Signs (24h Range):  Temp:  [97.7 °F (36.5 °C)-98.5 °F (36.9 °C)] 98.3 °F (36.8 °C)  Pulse:  [62-73] 62  Resp:  [16-20] 16  SpO2:  [95 %-96 %] 95 %  BP: (113-131)/(71-79) 130/75     Weight: 74.8 kg (165 lb)  Body mass index is 25.84 kg/m².    Intake/Output Summary (Last 24 hours) at 11/12/2020 1547  Last data filed at 11/12/2020 0540  Gross per 24 hour   Intake 1320 ml   Output --   Net 1320 ml      Physical Exam  Vitals signs and nursing note reviewed.   Constitutional:       General: He is not in acute distress.     Appearance: He is well-developed.   HENT:      Head: Normocephalic and atraumatic.   Eyes:      General:         Right eye: No discharge.         Left eye: No discharge.      Conjunctiva/sclera: Conjunctivae normal.   Cardiovascular:      Rate and Rhythm: Normal rate.      Pulses: Normal pulses.   Pulmonary:      Effort: Pulmonary effort is normal. No respiratory distress.   Abdominal:      General: Bowel sounds are decreased.      Palpations: Abdomen is soft.      Tenderness: There is abdominal tenderness.   Musculoskeletal: Normal range of motion.      Right lower leg: No edema.      Left lower leg: No edema.   Skin:     General: Skin is warm and dry.   Neurological:      Mental Status: He is alert and oriented to person, place, and time.       Significant Labs:   CBC:  Recent Labs   Lab 11/10/20  0543 11/11/20 0442 11/12/20  0559   WBC 8.46 7.80 10.53   HGB 11.9* 12.6* 13.2*   HCT 35.8* 36.4* 37.9*   * 421* 449*      BMP:  Recent Labs   Lab 11/10/20  0543 11/11/20 0442 11/12/20  0559    139 136   K 4.0 3.8 4.1    103 102   CO2 23 21* 20*   BUN 13 8 7   CREATININE 0.9 0.9 1.0   GLU 82 87 87   CALCIUM 8.6* 8.6* 9.0   MG 1.7 1.6 1.6   PHOS 3.4  3.7 3.8     Significant Imaging:   No new imaging this morning.      Assessment/Plan:      * SBO (small bowel obstruction)  Intraabdominal abscess  - s/p laparoscopic appendectomy for acute gangrenous appendicitis with associated abscess on 10/27/20.  - Surgery complicated by peritonitis s/p diagnostic laparoscopy with abdominal washout 10/29/20.  - CT Abd/Pelvis 11/07 showed SBO with transition point in R mid pelvis and collection vs abscess. Repeat CT 11/12 showing decrease in fluid collection size.  - Continuing empiric piperacillin-tazobactam 4.5g IV q8hr.  - Continuing clear liquids; advance as tolerated. Continuing IVFs with LR.  - Pain control improved; continuing hydrocodone-acetaminophen 5-325mg PO q4hr PRN, hydromorphone 0.5mg IV q3hr PRN for moderate/severe pain respectively.  - Continuing ketorolac 15mg IV q6hr for time being.  - Appreciate general surgery assistance.    VTE Risk Mitigation (From admission, onward)         Ordered     Place sequential compression device  Until discontinued      11/07/20 0836     IP VTE LOW RISK PATIENT  Once      11/07/20 0836                Discharge Planning   ITALIA: 11/11/2020     Code Status: Full Code   Is the patient medically ready for discharge?:     Reason for patient still in hospital (select all that apply): Treatment  Discharge Plan A: Home with family                  D Oren Loaiza MD  Department of Hospital Medicine   [facility]

## 2020-11-12 NOTE — ASSESSMENT & PLAN NOTE
Intraabdominal abscess  - s/p laparoscopic appendectomy for acute gangrenous appendicitis with associated abscess on 10/27/20.  - Surgery complicated by peritonitis s/p diagnostic laparoscopy with abdominal washout 10/29/20.  - CT Abd/Pelvis 11/07 showed SBO with transition point in R mid pelvis and collection vs abscess. Repeat CT 11/12 showing decrease in fluid collection size.  - Continuing empiric piperacillin-tazobactam 4.5g IV q8hr.  - Continuing clear liquids; advance as tolerated. Continuing IVFs with LR.  - Pain control improved; continuing hydrocodone-acetaminophen 5-325mg PO q4hr PRN, hydromorphone 0.5mg IV q3hr PRN for moderate/severe pain respectively.  - Continuing ketorolac 15mg IV q6hr for time being.  - Appreciate general surgery assistance.

## 2020-11-12 NOTE — SUBJECTIVE & OBJECTIVE
Interval History: No acute events overnight. Reports no flatus overnight, persistent abdominal distention. Some nausea, no vomiting. No other concerns at this time.    Review of Systems   Constitutional: Negative for chills and fever.   Respiratory: Negative for cough and shortness of breath.    Cardiovascular: Negative for chest pain and palpitations.   Gastrointestinal: Positive for abdominal pain and nausea. Negative for vomiting.     Objective:     Vital Signs (Most Recent):  Temp: 98.3 °F (36.8 °C) (11/12/20 1315)  Pulse: 62 (11/12/20 1315)  Resp: 16 (11/12/20 1315)  BP: 130/75 (11/12/20 1315)  SpO2: 95 % (11/12/20 1315) Vital Signs (24h Range):  Temp:  [97.7 °F (36.5 °C)-98.5 °F (36.9 °C)] 98.3 °F (36.8 °C)  Pulse:  [62-73] 62  Resp:  [16-20] 16  SpO2:  [95 %-96 %] 95 %  BP: (113-131)/(71-79) 130/75     Weight: 74.8 kg (165 lb)  Body mass index is 25.84 kg/m².    Intake/Output Summary (Last 24 hours) at 11/12/2020 1547  Last data filed at 11/12/2020 0540  Gross per 24 hour   Intake 1320 ml   Output --   Net 1320 ml      Physical Exam  Vitals signs and nursing note reviewed.   Constitutional:       General: He is not in acute distress.     Appearance: He is well-developed.   HENT:      Head: Normocephalic and atraumatic.   Eyes:      General:         Right eye: No discharge.         Left eye: No discharge.      Conjunctiva/sclera: Conjunctivae normal.   Cardiovascular:      Rate and Rhythm: Normal rate.      Pulses: Normal pulses.   Pulmonary:      Effort: Pulmonary effort is normal. No respiratory distress.   Abdominal:      General: Bowel sounds are decreased.      Palpations: Abdomen is soft.      Tenderness: There is abdominal tenderness.   Musculoskeletal: Normal range of motion.      Right lower leg: No edema.      Left lower leg: No edema.   Skin:     General: Skin is warm and dry.   Neurological:      Mental Status: He is alert and oriented to person, place, and time.       Significant Labs:    CBC:  Recent Labs   Lab 11/10/20  0543 11/11/20  0442 11/12/20  0559   WBC 8.46 7.80 10.53   HGB 11.9* 12.6* 13.2*   HCT 35.8* 36.4* 37.9*   * 421* 449*      BMP:  Recent Labs   Lab 11/10/20  0543 11/11/20  0442 11/12/20  0559    139 136   K 4.0 3.8 4.1    103 102   CO2 23 21* 20*   BUN 13 8 7   CREATININE 0.9 0.9 1.0   GLU 82 87 87   CALCIUM 8.6* 8.6* 9.0   MG 1.7 1.6 1.6   PHOS 3.4 3.7 3.8     Significant Imaging:   No new imaging this morning.

## 2020-11-12 NOTE — PLAN OF CARE
Vs stable. Medicated for c/o abd. Pain with relief. Belching infrequently. NPO maintained for ct scan in am.

## 2020-11-13 LAB
ALBUMIN SERPL BCP-MCNC: 3.1 G/DL (ref 3.5–5.2)
ANION GAP SERPL CALC-SCNC: 16 MMOL/L (ref 8–16)
BUN SERPL-MCNC: 8 MG/DL (ref 6–20)
CALCIUM SERPL-MCNC: 8.8 MG/DL (ref 8.7–10.5)
CHLORIDE SERPL-SCNC: 101 MMOL/L (ref 95–110)
CO2 SERPL-SCNC: 20 MMOL/L (ref 23–29)
CREAT SERPL-MCNC: 0.9 MG/DL (ref 0.5–1.4)
ERYTHROCYTE [DISTWIDTH] IN BLOOD BY AUTOMATED COUNT: 11.5 % (ref 11.5–14.5)
EST. GFR  (AFRICAN AMERICAN): >60 ML/MIN/1.73 M^2
EST. GFR  (NON AFRICAN AMERICAN): >60 ML/MIN/1.73 M^2
GLUCOSE SERPL-MCNC: 87 MG/DL (ref 70–110)
HCT VFR BLD AUTO: 38.6 % (ref 40–54)
HGB BLD-MCNC: 13.2 G/DL (ref 14–18)
MCH RBC QN AUTO: 30.3 PG (ref 27–31)
MCHC RBC AUTO-ENTMCNC: 34.2 G/DL (ref 32–36)
MCV RBC AUTO: 89 FL (ref 82–98)
PHOSPHATE SERPL-MCNC: 3.8 MG/DL (ref 2.7–4.5)
PLATELET # BLD AUTO: 417 K/UL (ref 150–350)
PMV BLD AUTO: 8.8 FL (ref 9.2–12.9)
POTASSIUM SERPL-SCNC: 4.1 MMOL/L (ref 3.5–5.1)
RBC # BLD AUTO: 4.36 M/UL (ref 4.6–6.2)
SODIUM SERPL-SCNC: 137 MMOL/L (ref 136–145)
WBC # BLD AUTO: 9.13 K/UL (ref 3.9–12.7)

## 2020-11-13 PROCEDURE — 99233 SBSQ HOSP IP/OBS HIGH 50: CPT | Mod: ,,, | Performed by: INTERNAL MEDICINE

## 2020-11-13 PROCEDURE — 25000003 PHARM REV CODE 250: Performed by: INTERNAL MEDICINE

## 2020-11-13 PROCEDURE — 63600175 PHARM REV CODE 636 W HCPCS: Performed by: PHYSICIAN ASSISTANT

## 2020-11-13 PROCEDURE — 99233 PR SUBSEQUENT HOSPITAL CARE,LEVL III: ICD-10-PCS | Mod: ,,, | Performed by: INTERNAL MEDICINE

## 2020-11-13 PROCEDURE — 80069 RENAL FUNCTION PANEL: CPT

## 2020-11-13 PROCEDURE — 25000003 PHARM REV CODE 250: Performed by: SPECIALIST

## 2020-11-13 PROCEDURE — 11000001 HC ACUTE MED/SURG PRIVATE ROOM

## 2020-11-13 PROCEDURE — 94761 N-INVAS EAR/PLS OXIMETRY MLT: CPT

## 2020-11-13 PROCEDURE — 85027 COMPLETE CBC AUTOMATED: CPT

## 2020-11-13 PROCEDURE — 36415 COLL VENOUS BLD VENIPUNCTURE: CPT

## 2020-11-13 PROCEDURE — 63600175 PHARM REV CODE 636 W HCPCS: Performed by: INTERNAL MEDICINE

## 2020-11-13 RX ORDER — KETOROLAC TROMETHAMINE 30 MG/ML
15 INJECTION, SOLUTION INTRAMUSCULAR; INTRAVENOUS EVERY 6 HOURS PRN
Status: DISCONTINUED | OUTPATIENT
Start: 2020-11-13 | End: 2020-11-16

## 2020-11-13 RX ORDER — FAMOTIDINE 10 MG/ML
20 INJECTION INTRAVENOUS 2 TIMES DAILY
Status: DISCONTINUED | OUTPATIENT
Start: 2020-11-13 | End: 2020-11-16

## 2020-11-13 RX ADMIN — DOCUSATE SODIUM 100 MG: 100 CAPSULE, LIQUID FILLED ORAL at 08:11

## 2020-11-13 RX ADMIN — PIPERACILLIN AND TAZOBACTAM 4.5 G: 4; .5 INJECTION, POWDER, LYOPHILIZED, FOR SOLUTION INTRAVENOUS; PARENTERAL at 02:11

## 2020-11-13 RX ADMIN — PROMETHAZINE HYDROCHLORIDE 6.25 MG: 25 INJECTION INTRAMUSCULAR; INTRAVENOUS at 04:11

## 2020-11-13 RX ADMIN — KETOROLAC TROMETHAMINE 15 MG: 30 INJECTION, SOLUTION INTRAMUSCULAR; INTRAVENOUS at 08:11

## 2020-11-13 RX ADMIN — PIPERACILLIN AND TAZOBACTAM 4.5 G: 4; .5 INJECTION, POWDER, LYOPHILIZED, FOR SOLUTION INTRAVENOUS; PARENTERAL at 05:11

## 2020-11-13 RX ADMIN — FAMOTIDINE 20 MG: 10 INJECTION INTRAVENOUS at 08:11

## 2020-11-13 RX ADMIN — KETOROLAC TROMETHAMINE 15 MG: 30 INJECTION, SOLUTION INTRAMUSCULAR; INTRAVENOUS at 02:11

## 2020-11-13 RX ADMIN — HYDROMORPHONE HYDROCHLORIDE 0.5 MG: 1 INJECTION, SOLUTION INTRAMUSCULAR; INTRAVENOUS; SUBCUTANEOUS at 05:11

## 2020-11-13 RX ADMIN — PIPERACILLIN AND TAZOBACTAM 4.5 G: 4; .5 INJECTION, POWDER, LYOPHILIZED, FOR SOLUTION INTRAVENOUS; PARENTERAL at 09:11

## 2020-11-13 RX ADMIN — ONDANSETRON 4 MG: 2 INJECTION INTRAMUSCULAR; INTRAVENOUS at 02:11

## 2020-11-13 RX ADMIN — ONDANSETRON 4 MG: 2 INJECTION INTRAMUSCULAR; INTRAVENOUS at 08:11

## 2020-11-13 RX ADMIN — KETOROLAC TROMETHAMINE 15 MG: 30 INJECTION, SOLUTION INTRAMUSCULAR; INTRAVENOUS at 12:11

## 2020-11-13 RX ADMIN — SODIUM CHLORIDE, SODIUM LACTATE, POTASSIUM CHLORIDE, AND CALCIUM CHLORIDE: .6; .31; .03; .02 INJECTION, SOLUTION INTRAVENOUS at 12:11

## 2020-11-13 NOTE — PLAN OF CARE
Pt remains free from injury or falls. Vital signs stable throughout night on room air. Positions self independently, ambulated in hallway x 2. Pain managed with IV Toradol,  Zofran and Phenergan administered for nausea. LR @ 100 ml/hr. Bed alarm set, bed in low locked position and call light within reach. Will continue to monitor.          0550- IV Dilaudid 0.5 mg administered for pain. Will continue to monitor.

## 2020-11-13 NOTE — PROGRESS NOTES
Afebrile  Vital signs stable  No BM/flatus    PE  Abdomen-mild distention, incisions clean and dry, positive bowel sounds    Plan  Check KUB

## 2020-11-13 NOTE — PROGRESS NOTES
Afebrile  Vital signs stable  No BM or flatus  Abdomen-soft, mild distention, nontender, active bowel sounds    Imaging  Dilated loops of small bowel with paucity of air in colon.    Impression/plan  Small-bowel obstruction/ileus, spoke with patient and girlfriend and offered laparotomy and enterolysis.  At this point the abdomen is not immediately compelling however there has been minimal progress towards resumption of bowel function.

## 2020-11-13 NOTE — NURSING
Pt remains free from injury or falls. Vital signs stable throughout shift on room air. Positions self independently, ambulated in hallway. Pain managed with IV Toradol.  LR @ 100 ml/hr. Bed alarm set, bed in low locked position and call light within reach. Will continue to monitor.  Purposeful hourly rounding maintained.

## 2020-11-13 NOTE — PROGRESS NOTES
Afebrile  Vital signs stable  Nauseated yesterday but better today  No BM or flatus    PE  Chest-clear  Heart-regular rate and rhythm  Abdomen-protuberant, positive bowel sounds, no rebound, no guarding    CT scan 11/11/2020  Dilated loops of small bowel with possible transition point in the pelvis suggestive of small-bowel obstruction    Impression/plan  SBO possible ileus  Discussed with patient and girlfriend Jena via phone.  The patient does not have an immediately compelling surgical abdomen however, he has made little progress an exploratory laparotomy with enterolysis appears to be more likely appropriate treatment.

## 2020-11-13 NOTE — PLAN OF CARE
Fall precaution maintained thru out shift.  Pain managed with pain medication.  No sign of infection noted.  Skin dry and intact.  Fluids infusing; no sign of dehydration.  Bed lock and at lowest position.  Side rails up x2; slip proof socks applied.  Call light within reached.  Will continue to monitor.  Maricruz Feliz

## 2020-11-13 NOTE — PROGRESS NOTES
[facility] Hospital Medicine  Progress Note    Patient Name: Giuseppe Valdez  MRN: [id number]  Patient Class: IP- Inpatient   Admission Date: 11/7/2020  Length of Stay: 6 days  Attending Physician: SHEA Loaiza MD  Primary Care Provider: Tano Benjamin MD        Subjective:     Principal Problem:SBO (small bowel obstruction)        HPI:  34 y/o M presents to the ED today complaining of abdominal pain. He was hospitalized in [place] 10/27-11/1 where he was diagnosed with gangrenous appendicitis s/p laparoscopic appendectomy on 10/27 complicated by peritonitis s/p exploratory laparoscopy on 10/29. After discharge he was improving until early this morning when he had sudden onset of severe lower abdominal pain. He denies associated fever, chills, nausea, vomiting or diarrhea. He has been taking augmentin since discharge. He took tramadol for the pain without improvement. Pain was rated 8-9/10 at its worst after tramadol so he presented to the ED for evaluation. His last BM was yesterday. In the ED he was found to have SBO with abscess so referred for admission.    He works as a  but currently on leave due to recent surgery. He denies any medical problems and takes no chronic medications.    Overview/Hospital Course:  Patient admitted with SBO and possible developing abscess. He was made NPO and started on empiric Zosyn and IVF. General surgery was consulted. His pain was difficult to control with toradol and dilaudid. Discussed drain placement with IR but fluid collection was too small to attempt.    Interval History: No acute events overnight. Reports no flatus overnight, persistent abdominal distention. Discussed with Dr. Nicholas. Some nausea, no vomiting. No other concerns at this time.    Review of Systems   Constitutional: Negative for chills and fever.   Respiratory: Negative for cough and shortness of breath.    Cardiovascular: Negative for chest pain and palpitations.    Gastrointestinal: Positive for abdominal pain and nausea. Negative for vomiting.     Objective:     Vital Signs (Most Recent):  Temp: 98.8 °F (37.1 °C) (11/13/20 1556)  Pulse: 74 (11/13/20 1556)  Resp: 18 (11/13/20 1556)  BP: 132/66 (11/13/20 1556)  SpO2: 95 % (11/13/20 1556) Vital Signs (24h Range):  Temp:  [97.4 °F (36.3 °C)-98.8 °F (37.1 °C)] 98.8 °F (37.1 °C)  Pulse:  [60-82] 74  Resp:  [15-18] 18  SpO2:  [95 %-97 %] 95 %  BP: (123-145)/(66-85) 132/66     Weight: 74.8 kg (165 lb)  Body mass index is 25.84 kg/m².    Intake/Output Summary (Last 24 hours) at 11/13/2020 1629  Last data filed at 11/13/2020 0525  Gross per 24 hour   Intake 1033.33 ml   Output --   Net 1033.33 ml      Physical Exam  Vitals signs and nursing note reviewed.   Constitutional:       General: He is not in acute distress.     Appearance: He is well-developed.   HENT:      Head: Normocephalic and atraumatic.   Eyes:      General:         Right eye: No discharge.         Left eye: No discharge.      Conjunctiva/sclera: Conjunctivae normal.   Cardiovascular:      Rate and Rhythm: Normal rate.      Pulses: Normal pulses.   Pulmonary:      Effort: Pulmonary effort is normal. No respiratory distress.   Abdominal:      General: Bowel sounds are decreased.      Palpations: Abdomen is soft.      Tenderness: There is abdominal tenderness.   Musculoskeletal: Normal range of motion.      Right lower leg: No edema.      Left lower leg: No edema.   Skin:     General: Skin is warm and dry.   Neurological:      Mental Status: He is alert and oriented to person, place, and time.       Significant Labs:   CBC:  Recent Labs   Lab 11/11/20  0442 11/12/20  0559 11/13/20  0601   WBC 7.80 10.53 9.13   HGB 12.6* 13.2* 13.2*   HCT 36.4* 37.9* 38.6*   * 449* 417*      BMP:  Recent Labs   Lab 11/11/20  0442 11/12/20  0559 11/13/20  0601    136 137   K 3.8 4.1 4.1    102 101   CO2 21* 20* 20*   BUN 8 7 8   CREATININE 0.9 1.0 0.9   GLU 87 87 87    CALCIUM 8.6* 9.0 8.8   MG 1.6 1.6  --    PHOS 3.7 3.8 3.8     Significant Imaging:   No new imaging this morning.      Assessment/Plan:      * SBO (small bowel obstruction)  Intraabdominal abscess  - s/p laparoscopic appendectomy for acute gangrenous appendicitis with associated abscess on 10/27/20.  - Surgery complicated by peritonitis s/p diagnostic laparoscopy with abdominal washout 10/29/20.  - CT Abd/Pelvis 11/07 showed SBO with transition point in R mid pelvis and collection vs abscess. Repeat CT 11/12 showing decrease in fluid collection size.  - Continuing empiric piperacillin-tazobactam 4.5g IV q8hr.  - Continuing clear liquids. Continuing IVFs with LR.  - Pain control improved; continuing hydrocodone-acetaminophen 5-325mg PO q4hr PRN, hydromorphone 0.5mg IV q3hr PRN for moderate/severe pain respectively.  - Continuing ketorolac 15mg IV q6hr as PRN; monitor renal function daily and start famotidine 20mg IV BID for GI prophylaxis.  - Discussed with general surgery: progress has slowed significantly, but no clear indications to proceed to surgery at this time. Repeat KUB this morning.  - Appreciate general surgery assistance.    VTE Risk Mitigation (From admission, onward)         Ordered     Place sequential compression device  Until discontinued      11/07/20 0836     IP VTE LOW RISK PATIENT  Once      11/07/20 0836                Discharge Planning   ITALIA: 11/11/2020     Code Status: Full Code   Is the patient medically ready for discharge?:     Reason for patient still in hospital (select all that apply): Treatment  Discharge Plan A: Home with family                  D Oren Loaiza MD  Department of Hospital Medicine   [facility]

## 2020-11-13 NOTE — SUBJECTIVE & OBJECTIVE
Interval History: No acute events overnight. Reports no flatus overnight, persistent abdominal distention. Discussed with Dr. Nicholas. Some nausea, no vomiting. No other concerns at this time.    Review of Systems   Constitutional: Negative for chills and fever.   Respiratory: Negative for cough and shortness of breath.    Cardiovascular: Negative for chest pain and palpitations.   Gastrointestinal: Positive for abdominal pain and nausea. Negative for vomiting.     Objective:     Vital Signs (Most Recent):  Temp: 98.8 °F (37.1 °C) (11/13/20 1556)  Pulse: 74 (11/13/20 1556)  Resp: 18 (11/13/20 1556)  BP: 132/66 (11/13/20 1556)  SpO2: 95 % (11/13/20 1556) Vital Signs (24h Range):  Temp:  [97.4 °F (36.3 °C)-98.8 °F (37.1 °C)] 98.8 °F (37.1 °C)  Pulse:  [60-82] 74  Resp:  [15-18] 18  SpO2:  [95 %-97 %] 95 %  BP: (123-145)/(66-85) 132/66     Weight: 74.8 kg (165 lb)  Body mass index is 25.84 kg/m².    Intake/Output Summary (Last 24 hours) at 11/13/2020 1629  Last data filed at 11/13/2020 0525  Gross per 24 hour   Intake 1033.33 ml   Output --   Net 1033.33 ml      Physical Exam  Vitals signs and nursing note reviewed.   Constitutional:       General: He is not in acute distress.     Appearance: He is well-developed.   HENT:      Head: Normocephalic and atraumatic.   Eyes:      General:         Right eye: No discharge.         Left eye: No discharge.      Conjunctiva/sclera: Conjunctivae normal.   Cardiovascular:      Rate and Rhythm: Normal rate.      Pulses: Normal pulses.   Pulmonary:      Effort: Pulmonary effort is normal. No respiratory distress.   Abdominal:      General: Bowel sounds are decreased.      Palpations: Abdomen is soft.      Tenderness: There is abdominal tenderness.   Musculoskeletal: Normal range of motion.      Right lower leg: No edema.      Left lower leg: No edema.   Skin:     General: Skin is warm and dry.   Neurological:      Mental Status: He is alert and oriented to person, place, and time.        Significant Labs:   CBC:  Recent Labs   Lab 11/11/20  0442 11/12/20  0559 11/13/20  0601   WBC 7.80 10.53 9.13   HGB 12.6* 13.2* 13.2*   HCT 36.4* 37.9* 38.6*   * 449* 417*      BMP:  Recent Labs   Lab 11/11/20  0442 11/12/20  0559 11/13/20  0601    136 137   K 3.8 4.1 4.1    102 101   CO2 21* 20* 20*   BUN 8 7 8   CREATININE 0.9 1.0 0.9   GLU 87 87 87   CALCIUM 8.6* 9.0 8.8   MG 1.6 1.6  --    PHOS 3.7 3.8 3.8     Significant Imaging:   No new imaging this morning.

## 2020-11-13 NOTE — ASSESSMENT & PLAN NOTE
Intraabdominal abscess  - s/p laparoscopic appendectomy for acute gangrenous appendicitis with associated abscess on 10/27/20.  - Surgery complicated by peritonitis s/p diagnostic laparoscopy with abdominal washout 10/29/20.  - CT Abd/Pelvis 11/07 showed SBO with transition point in R mid pelvis and collection vs abscess. Repeat CT 11/12 showing decrease in fluid collection size.  - Continuing empiric piperacillin-tazobactam 4.5g IV q8hr.  - Continuing clear liquids. Continuing IVFs with LR.  - Pain control improved; continuing hydrocodone-acetaminophen 5-325mg PO q4hr PRN, hydromorphone 0.5mg IV q3hr PRN for moderate/severe pain respectively.  - Continuing ketorolac 15mg IV q6hr as PRN; monitor renal function daily and start famotidine 20mg IV BID for GI prophylaxis.  - Discussed with general surgery: progress has slowed significantly, but no clear indications to proceed to surgery at this time. Repeat KUB this morning.  - Appreciate general surgery assistance.

## 2020-11-14 LAB
ALBUMIN SERPL BCP-MCNC: 3.1 G/DL (ref 3.5–5.2)
ANION GAP SERPL CALC-SCNC: 14 MMOL/L (ref 8–16)
BUN SERPL-MCNC: 11 MG/DL (ref 6–20)
CALCIUM SERPL-MCNC: 8.9 MG/DL (ref 8.7–10.5)
CHLORIDE SERPL-SCNC: 100 MMOL/L (ref 95–110)
CO2 SERPL-SCNC: 21 MMOL/L (ref 23–29)
CREAT SERPL-MCNC: 1 MG/DL (ref 0.5–1.4)
ERYTHROCYTE [DISTWIDTH] IN BLOOD BY AUTOMATED COUNT: 11.8 % (ref 11.5–14.5)
EST. GFR  (AFRICAN AMERICAN): >60 ML/MIN/1.73 M^2
EST. GFR  (NON AFRICAN AMERICAN): >60 ML/MIN/1.73 M^2
GLUCOSE SERPL-MCNC: 101 MG/DL (ref 70–110)
HCT VFR BLD AUTO: 38.9 % (ref 40–54)
HGB BLD-MCNC: 13.6 G/DL (ref 14–18)
MCH RBC QN AUTO: 30.6 PG (ref 27–31)
MCHC RBC AUTO-ENTMCNC: 35 G/DL (ref 32–36)
MCV RBC AUTO: 88 FL (ref 82–98)
PHOSPHATE SERPL-MCNC: 3.7 MG/DL (ref 2.7–4.5)
PLATELET # BLD AUTO: 395 K/UL (ref 150–350)
PMV BLD AUTO: 8.5 FL (ref 9.2–12.9)
POTASSIUM SERPL-SCNC: 4.5 MMOL/L (ref 3.5–5.1)
RBC # BLD AUTO: 4.44 M/UL (ref 4.6–6.2)
SODIUM SERPL-SCNC: 135 MMOL/L (ref 136–145)
WBC # BLD AUTO: 15.12 K/UL (ref 3.9–12.7)

## 2020-11-14 PROCEDURE — 99233 SBSQ HOSP IP/OBS HIGH 50: CPT | Mod: ,,, | Performed by: INTERNAL MEDICINE

## 2020-11-14 PROCEDURE — 63600175 PHARM REV CODE 636 W HCPCS: Performed by: INTERNAL MEDICINE

## 2020-11-14 PROCEDURE — 85027 COMPLETE CBC AUTOMATED: CPT

## 2020-11-14 PROCEDURE — 25000003 PHARM REV CODE 250: Performed by: INTERNAL MEDICINE

## 2020-11-14 PROCEDURE — 99233 PR SUBSEQUENT HOSPITAL CARE,LEVL III: ICD-10-PCS | Mod: ,,, | Performed by: INTERNAL MEDICINE

## 2020-11-14 PROCEDURE — 80069 RENAL FUNCTION PANEL: CPT

## 2020-11-14 PROCEDURE — 94761 N-INVAS EAR/PLS OXIMETRY MLT: CPT

## 2020-11-14 PROCEDURE — 36415 COLL VENOUS BLD VENIPUNCTURE: CPT

## 2020-11-14 PROCEDURE — 11000001 HC ACUTE MED/SURG PRIVATE ROOM

## 2020-11-14 RX ADMIN — PIPERACILLIN AND TAZOBACTAM 4.5 G: 4; .5 INJECTION, POWDER, LYOPHILIZED, FOR SOLUTION INTRAVENOUS; PARENTERAL at 09:11

## 2020-11-14 RX ADMIN — PIPERACILLIN AND TAZOBACTAM 4.5 G: 4; .5 INJECTION, POWDER, LYOPHILIZED, FOR SOLUTION INTRAVENOUS; PARENTERAL at 03:11

## 2020-11-14 RX ADMIN — HYDROMORPHONE HYDROCHLORIDE 0.5 MG: 1 INJECTION, SOLUTION INTRAMUSCULAR; INTRAVENOUS; SUBCUTANEOUS at 12:11

## 2020-11-14 RX ADMIN — ONDANSETRON 4 MG: 2 INJECTION INTRAMUSCULAR; INTRAVENOUS at 04:11

## 2020-11-14 RX ADMIN — HYDROMORPHONE HYDROCHLORIDE 0.5 MG: 1 INJECTION, SOLUTION INTRAMUSCULAR; INTRAVENOUS; SUBCUTANEOUS at 10:11

## 2020-11-14 RX ADMIN — HYDROMORPHONE HYDROCHLORIDE 0.5 MG: 1 INJECTION, SOLUTION INTRAMUSCULAR; INTRAVENOUS; SUBCUTANEOUS at 03:11

## 2020-11-14 RX ADMIN — FAMOTIDINE 20 MG: 10 INJECTION INTRAVENOUS at 09:11

## 2020-11-14 RX ADMIN — KETOROLAC TROMETHAMINE 15 MG: 30 INJECTION, SOLUTION INTRAMUSCULAR; INTRAVENOUS at 03:11

## 2020-11-14 RX ADMIN — KETOROLAC TROMETHAMINE 15 MG: 30 INJECTION, SOLUTION INTRAMUSCULAR; INTRAVENOUS at 09:11

## 2020-11-14 RX ADMIN — KETOROLAC TROMETHAMINE 15 MG: 30 INJECTION, SOLUTION INTRAMUSCULAR; INTRAVENOUS at 08:11

## 2020-11-14 RX ADMIN — SODIUM CHLORIDE, SODIUM LACTATE, POTASSIUM CHLORIDE, AND CALCIUM CHLORIDE: .6; .31; .03; .02 INJECTION, SOLUTION INTRAVENOUS at 08:11

## 2020-11-14 RX ADMIN — PIPERACILLIN AND TAZOBACTAM 4.5 G: 4; .5 INJECTION, POWDER, LYOPHILIZED, FOR SOLUTION INTRAVENOUS; PARENTERAL at 06:11

## 2020-11-14 RX ADMIN — FAMOTIDINE 20 MG: 10 INJECTION INTRAVENOUS at 08:11

## 2020-11-14 RX ADMIN — SODIUM CHLORIDE, SODIUM LACTATE, POTASSIUM CHLORIDE, AND CALCIUM CHLORIDE: .6; .31; .03; .02 INJECTION, SOLUTION INTRAVENOUS at 09:11

## 2020-11-14 RX ADMIN — SODIUM CHLORIDE, SODIUM LACTATE, POTASSIUM CHLORIDE, AND CALCIUM CHLORIDE: .6; .31; .03; .02 INJECTION, SOLUTION INTRAVENOUS at 12:11

## 2020-11-14 RX ADMIN — KETOROLAC TROMETHAMINE 15 MG: 30 INJECTION, SOLUTION INTRAMUSCULAR; INTRAVENOUS at 02:11

## 2020-11-14 NOTE — PROGRESS NOTES
[facility] Hospital Medicine  Progress Note    Patient Name: Giuseppe Valdez  MRN: [id number]  Patient Class: IP- Inpatient   Admission Date: 11/7/2020  Length of Stay: 7 days  Attending Physician: SHEA Loaiza MD  Primary Care Provider: Tano Benjamin MD        Subjective:     Principal Problem:SBO (small bowel obstruction)        HPI:  36 y/o M presents to the ED today complaining of abdominal pain. He was hospitalized in [place] 10/27-11/1 where he was diagnosed with gangrenous appendicitis s/p laparoscopic appendectomy on 10/27 complicated by peritonitis s/p exploratory laparoscopy on 10/29. After discharge he was improving until early this morning when he had sudden onset of severe lower abdominal pain. He denies associated fever, chills, nausea, vomiting or diarrhea. He has been taking augmentin since discharge. He took tramadol for the pain without improvement. Pain was rated 8-9/10 at its worst after tramadol so he presented to the ED for evaluation. His last BM was yesterday. In the ED he was found to have SBO with abscess so referred for admission.    He works as a  but currently on leave due to recent surgery. He denies any medical problems and takes no chronic medications.    Overview/Hospital Course:  Patient admitted with SBO and possible developing abscess. He was made NPO and started on empiric Zosyn and IVF. General surgery was consulted. His pain was difficult to control with toradol and dilaudid. Discussed drain placement with IR but fluid collection was too small to attempt.    Interval History: Nausea and vomiting x 2 overnight as well as pain. Improved with medications but still having significant nausea.    Review of Systems   Constitutional: Negative for chills and fever.   Respiratory: Negative for cough and shortness of breath.    Cardiovascular: Negative for chest pain and palpitations.   Gastrointestinal: Positive for abdominal pain, nausea and vomiting.      Objective:     Vital Signs (Most Recent):  Temp: 97.2 °F (36.2 °C) (11/14/20 0601)  Pulse: 70 (11/14/20 0601)  Resp: 16 (11/14/20 0601)  BP: 113/68 (11/14/20 0601)  SpO2: (!) 93 % (11/14/20 0601) Vital Signs (24h Range):  Temp:  [97.2 °F (36.2 °C)-98.8 °F (37.1 °C)] 97.2 °F (36.2 °C)  Pulse:  [65-80] 70  Resp:  [16-20] 16  SpO2:  [93 %-97 %] 93 %  BP: (113-132)/(66-82) 113/68     Weight: 74.8 kg (165 lb)  Body mass index is 25.84 kg/m².    Intake/Output Summary (Last 24 hours) at 11/14/2020 1139  Last data filed at 11/14/2020 0630  Gross per 24 hour   Intake 1400 ml   Output 300 ml   Net 1100 ml      Physical Exam  Vitals signs and nursing note reviewed.   Constitutional:       General: He is not in acute distress.     Appearance: He is well-developed.   HENT:      Head: Normocephalic and atraumatic.   Eyes:      General:         Right eye: No discharge.         Left eye: No discharge.      Conjunctiva/sclera: Conjunctivae normal.   Cardiovascular:      Rate and Rhythm: Normal rate.      Pulses: Normal pulses.   Pulmonary:      Effort: Pulmonary effort is normal. No respiratory distress.   Abdominal:      General: Bowel sounds are decreased.      Palpations: Abdomen is soft.      Tenderness: There is abdominal tenderness.   Musculoskeletal: Normal range of motion.      Right lower leg: No edema.      Left lower leg: No edema.   Skin:     General: Skin is warm and dry.   Neurological:      Mental Status: He is alert and oriented to person, place, and time.       Significant Labs:   CBC:  Recent Labs   Lab 11/12/20 0559 11/13/20 0601 11/14/20 0600   WBC 10.53 9.13 15.12*   HGB 13.2* 13.2* 13.6*   HCT 37.9* 38.6* 38.9*   * 417* 395*      BMP:  Recent Labs   Lab 11/12/20 0559 11/13/20  0601 11/14/20  0600    137 135*   K 4.1 4.1 4.5    101 100   CO2 20* 20* 21*   BUN 7 8 11   CREATININE 1.0 0.9 1.0   GLU 87 87 101   CALCIUM 9.0 8.8 8.9   MG 1.6  --   --    PHOS 3.8 3.8 3.7     Significant  Imaging:   No new imaging this morning.      Assessment/Plan:      * SBO (small bowel obstruction)  Intraabdominal abscess  - s/p laparoscopic appendectomy for acute gangrenous appendicitis with associated abscess on 10/27/20.  - Surgery complicated by peritonitis s/p diagnostic laparoscopy with abdominal washout 10/29/20.  - CT Abd/Pelvis 11/07 showed SBO with transition point in R mid pelvis and collection vs abscess. Repeat CT 11/12 showing decrease in fluid collection size.  - Continuing empiric piperacillin-tazobactam 4.5g IV q8hr.  - Continuing clear liquids. Continuing IVFs with LR.  - Pain control improved; continuing hydrocodone-acetaminophen 5-325mg PO q4hr PRN, hydromorphone 0.5mg IV q3hr PRN for moderate/severe pain respectively.  - Continuing ketorolac 15mg IV q6hr as PRN; monitor renal function daily, continue famotidine 20mg IV BID for GI prophylaxis.  - Place NGT and start low-intermittent suction.  - Appreciate general surgery assistance.    VTE Risk Mitigation (From admission, onward)         Ordered     Place sequential compression device  Until discontinued      11/07/20 0836     IP VTE LOW RISK PATIENT  Once      11/07/20 0836                Discharge Planning   ITALIA: 11/11/2020     Code Status: Full Code   Is the patient medically ready for discharge?:     Reason for patient still in hospital (select all that apply): Treatment  Discharge Plan A: Home with family                  D Oren Loaiza MD  Department of Hospital Medicine   [facility]

## 2020-11-14 NOTE — PROGRESS NOTES
Vomited last night.  -flatus, BM.  Afeb  VSS  Abd distended but fairly soft.  WBC 15.1  Hct 38.9  Discussed possible need for exp lap.  Discussed with Dr Loaiza.  Repeat abd film

## 2020-11-14 NOTE — SUBJECTIVE & OBJECTIVE
Interval History: Nausea and vomiting x 2 overnight as well as pain. Improved with medications but still having significant nausea.    Review of Systems   Constitutional: Negative for chills and fever.   Respiratory: Negative for cough and shortness of breath.    Cardiovascular: Negative for chest pain and palpitations.   Gastrointestinal: Positive for abdominal pain, nausea and vomiting.     Objective:     Vital Signs (Most Recent):  Temp: 97.2 °F (36.2 °C) (11/14/20 0601)  Pulse: 70 (11/14/20 0601)  Resp: 16 (11/14/20 0601)  BP: 113/68 (11/14/20 0601)  SpO2: (!) 93 % (11/14/20 0601) Vital Signs (24h Range):  Temp:  [97.2 °F (36.2 °C)-98.8 °F (37.1 °C)] 97.2 °F (36.2 °C)  Pulse:  [65-80] 70  Resp:  [16-20] 16  SpO2:  [93 %-97 %] 93 %  BP: (113-132)/(66-82) 113/68     Weight: 74.8 kg (165 lb)  Body mass index is 25.84 kg/m².    Intake/Output Summary (Last 24 hours) at 11/14/2020 1139  Last data filed at 11/14/2020 0630  Gross per 24 hour   Intake 1400 ml   Output 300 ml   Net 1100 ml      Physical Exam  Vitals signs and nursing note reviewed.   Constitutional:       General: He is not in acute distress.     Appearance: He is well-developed.   HENT:      Head: Normocephalic and atraumatic.   Eyes:      General:         Right eye: No discharge.         Left eye: No discharge.      Conjunctiva/sclera: Conjunctivae normal.   Cardiovascular:      Rate and Rhythm: Normal rate.      Pulses: Normal pulses.   Pulmonary:      Effort: Pulmonary effort is normal. No respiratory distress.   Abdominal:      General: Bowel sounds are decreased.      Palpations: Abdomen is soft.      Tenderness: There is abdominal tenderness.   Musculoskeletal: Normal range of motion.      Right lower leg: No edema.      Left lower leg: No edema.   Skin:     General: Skin is warm and dry.   Neurological:      Mental Status: He is alert and oriented to person, place, and time.       Significant Labs:   CBC:  Recent Labs   Lab 11/12/20  4775  11/13/20  0601 11/14/20  0600   WBC 10.53 9.13 15.12*   HGB 13.2* 13.2* 13.6*   HCT 37.9* 38.6* 38.9*   * 417* 395*      BMP:  Recent Labs   Lab 11/12/20  0559 11/13/20  0601 11/14/20  0600    137 135*   K 4.1 4.1 4.5    101 100   CO2 20* 20* 21*   BUN 7 8 11   CREATININE 1.0 0.9 1.0   GLU 87 87 101   CALCIUM 9.0 8.8 8.9   MG 1.6  --   --    PHOS 3.8 3.8 3.7     Significant Imaging:   No new imaging this morning.

## 2020-11-14 NOTE — PLAN OF CARE
"7:41 AM  Emesis overnight x2    5:18 PM                         Resting comfortably in bed at this time.  VSS on RA and afebrile this shift. Pain managed c IV Toradol x2 this shift.    NG tube in place at Left Nare s S/S of breakdown.  SX @ wall set to Low Intermittent Output   Output Fecal matter 600 = 200cc + 400cc      Great UOP this shift, independent with BRP.  Repositions self independently in bed and ambulating Temple x4 c standby assist.       Free from injury or skin breakdown; Fall precautions maintained and call light in reach.  POC updated questions answered and comments acknowledged.  Purposeful hourly rounding completed this shift.    Gen surg following  POD 22 of exp lap in [place].    Management denied SO request to remain at bedside overnight, Care team, patient, and SO notified of denial as "no criteria met"  "

## 2020-11-15 LAB
ALBUMIN SERPL BCP-MCNC: 3 G/DL (ref 3.5–5.2)
ANION GAP SERPL CALC-SCNC: 13 MMOL/L (ref 8–16)
BUN SERPL-MCNC: 9 MG/DL (ref 6–20)
CALCIUM SERPL-MCNC: 8.6 MG/DL (ref 8.7–10.5)
CHLORIDE SERPL-SCNC: 99 MMOL/L (ref 95–110)
CO2 SERPL-SCNC: 24 MMOL/L (ref 23–29)
CREAT SERPL-MCNC: 1.1 MG/DL (ref 0.5–1.4)
ERYTHROCYTE [DISTWIDTH] IN BLOOD BY AUTOMATED COUNT: 11.7 % (ref 11.5–14.5)
EST. GFR  (AFRICAN AMERICAN): >60 ML/MIN/1.73 M^2
EST. GFR  (NON AFRICAN AMERICAN): >60 ML/MIN/1.73 M^2
GLUCOSE SERPL-MCNC: 83 MG/DL (ref 70–110)
HCT VFR BLD AUTO: 36.2 % (ref 40–54)
HGB BLD-MCNC: 13 G/DL (ref 14–18)
MCH RBC QN AUTO: 31.7 PG (ref 27–31)
MCHC RBC AUTO-ENTMCNC: 35.9 G/DL (ref 32–36)
MCV RBC AUTO: 88 FL (ref 82–98)
PHOSPHATE SERPL-MCNC: 3.5 MG/DL (ref 2.7–4.5)
PLATELET # BLD AUTO: 323 K/UL (ref 150–350)
PMV BLD AUTO: 8.8 FL (ref 9.2–12.9)
POTASSIUM SERPL-SCNC: 3.7 MMOL/L (ref 3.5–5.1)
RBC # BLD AUTO: 4.1 M/UL (ref 4.6–6.2)
SODIUM SERPL-SCNC: 136 MMOL/L (ref 136–145)
WBC # BLD AUTO: 5.92 K/UL (ref 3.9–12.7)

## 2020-11-15 PROCEDURE — 25000003 PHARM REV CODE 250: Performed by: INTERNAL MEDICINE

## 2020-11-15 PROCEDURE — 99233 PR SUBSEQUENT HOSPITAL CARE,LEVL III: ICD-10-PCS | Mod: ,,, | Performed by: INTERNAL MEDICINE

## 2020-11-15 PROCEDURE — 85027 COMPLETE CBC AUTOMATED: CPT

## 2020-11-15 PROCEDURE — 63600175 PHARM REV CODE 636 W HCPCS: Performed by: INTERNAL MEDICINE

## 2020-11-15 PROCEDURE — 11000001 HC ACUTE MED/SURG PRIVATE ROOM

## 2020-11-15 PROCEDURE — 94761 N-INVAS EAR/PLS OXIMETRY MLT: CPT

## 2020-11-15 PROCEDURE — 80069 RENAL FUNCTION PANEL: CPT

## 2020-11-15 PROCEDURE — 99233 SBSQ HOSP IP/OBS HIGH 50: CPT | Mod: ,,, | Performed by: INTERNAL MEDICINE

## 2020-11-15 PROCEDURE — 36415 COLL VENOUS BLD VENIPUNCTURE: CPT

## 2020-11-15 RX ADMIN — FAMOTIDINE 20 MG: 10 INJECTION INTRAVENOUS at 12:11

## 2020-11-15 RX ADMIN — KETOROLAC TROMETHAMINE 15 MG: 30 INJECTION, SOLUTION INTRAMUSCULAR; INTRAVENOUS at 06:11

## 2020-11-15 RX ADMIN — KETOROLAC TROMETHAMINE 15 MG: 30 INJECTION, SOLUTION INTRAMUSCULAR; INTRAVENOUS at 05:11

## 2020-11-15 RX ADMIN — PIPERACILLIN AND TAZOBACTAM 4.5 G: 4; .5 INJECTION, POWDER, LYOPHILIZED, FOR SOLUTION INTRAVENOUS; PARENTERAL at 10:11

## 2020-11-15 RX ADMIN — FAMOTIDINE 20 MG: 10 INJECTION INTRAVENOUS at 10:11

## 2020-11-15 RX ADMIN — KETOROLAC TROMETHAMINE 15 MG: 30 INJECTION, SOLUTION INTRAMUSCULAR; INTRAVENOUS at 12:11

## 2020-11-15 RX ADMIN — SODIUM CHLORIDE, SODIUM LACTATE, POTASSIUM CHLORIDE, AND CALCIUM CHLORIDE: .6; .31; .03; .02 INJECTION, SOLUTION INTRAVENOUS at 03:11

## 2020-11-15 RX ADMIN — PIPERACILLIN AND TAZOBACTAM 4.5 G: 4; .5 INJECTION, POWDER, LYOPHILIZED, FOR SOLUTION INTRAVENOUS; PARENTERAL at 03:11

## 2020-11-15 RX ADMIN — PIPERACILLIN AND TAZOBACTAM 4.5 G: 4; .5 INJECTION, POWDER, LYOPHILIZED, FOR SOLUTION INTRAVENOUS; PARENTERAL at 05:11

## 2020-11-15 RX ADMIN — SODIUM CHLORIDE, SODIUM LACTATE, POTASSIUM CHLORIDE, AND CALCIUM CHLORIDE: .6; .31; .03; .02 INJECTION, SOLUTION INTRAVENOUS at 06:11

## 2020-11-15 RX ADMIN — SODIUM CHLORIDE, SODIUM LACTATE, POTASSIUM CHLORIDE, AND CALCIUM CHLORIDE: .6; .31; .03; .02 INJECTION, SOLUTION INTRAVENOUS at 10:11

## 2020-11-15 NOTE — ASSESSMENT & PLAN NOTE
Intraabdominal abscess  - s/p laparoscopic appendectomy for acute gangrenous appendicitis with associated abscess on 10/27/20.  - Surgery complicated by peritonitis s/p diagnostic laparoscopy with abdominal washout 10/29/20.  - CT Abd/Pelvis 11/07 showed SBO with transition point in R mid pelvis and collection vs abscess. Repeat CT 11/12 showing decrease in fluid collection size.  - Continuing empiric piperacillin-tazobactam 4.5g IV q8hr.  - Continuing clear liquids. Continuing IVFs with LR.  - Pain control improved; continuing hydrocodone-acetaminophen 5-325mg PO q4hr PRN, hydromorphone 0.5mg IV q3hr PRN for moderate/severe pain respectively.  - Continuing ketorolac 15mg IV q6hr as PRN; monitor renal function daily, continue famotidine 20mg IV BID for GI prophylaxis.  - Place NGT and start low-intermittent suction.  - Appreciate general surgery assistance.

## 2020-11-15 NOTE — PROGRESS NOTES
[facility] Hospital Medicine  Progress Note    Patient Name: Giuseppe Valdez  MRN: [id number]  Patient Class: IP- Inpatient   Admission Date: 11/7/2020  Length of Stay: 8 days  Attending Physician: SHEA Loaiza MD  Primary Care Provider: Tano Benjamin MD        Subjective:     Principal Problem:SBO (small bowel obstruction)        HPI:  34 y/o M presents to the ED today complaining of abdominal pain. He was hospitalized in [place] 10/27-11/1 where he was diagnosed with gangrenous appendicitis s/p laparoscopic appendectomy on 10/27 complicated by peritonitis s/p exploratory laparoscopy on 10/29. After discharge he was improving until early this morning when he had sudden onset of severe lower abdominal pain. He denies associated fever, chills, nausea, vomiting or diarrhea. He has been taking augmentin since discharge. He took tramadol for the pain without improvement. Pain was rated 8-9/10 at its worst after tramadol so he presented to the ED for evaluation. His last BM was yesterday. In the ED he was found to have SBO with abscess so referred for admission.    He works as a  but currently on leave due to recent surgery. He denies any medical problems and takes no chronic medications.    Overview/Hospital Course:  Patient admitted with SBO and possible developing abscess. He was made NPO and started on empiric Zosyn and IVF. General surgery was consulted. His pain was difficult to control with toradol and dilaudid. Discussed drain placement with IR but fluid collection was too small to attempt.    Interval History: No acute events overnight. Feeling improved with less bloating. No other concerns at this time.    Review of Systems   Constitutional: Negative for chills and fever.   Respiratory: Negative for cough and shortness of breath.    Cardiovascular: Negative for chest pain and palpitations.   Gastrointestinal: Positive for abdominal pain, nausea and vomiting.     Objective:     Vital  Signs (Most Recent):  Temp: 98.3 °F (36.8 °C) (11/15/20 1220)  Pulse: 68 (11/15/20 1220)  Resp: 18 (11/15/20 1220)  BP: 123/73 (11/15/20 1220)  SpO2: 97 % (11/15/20 1220) Vital Signs (24h Range):  Temp:  [97.6 °F (36.4 °C)-99.6 °F (37.6 °C)] 98.3 °F (36.8 °C)  Pulse:  [61-79] 68  Resp:  [16-18] 18  SpO2:  [96 %-98 %] 97 %  BP: (119-139)/(73-87) 123/73     Weight: 74.8 kg (165 lb)  Body mass index is 25.84 kg/m².    Intake/Output Summary (Last 24 hours) at 11/15/2020 1933  Last data filed at 11/15/2020 1548  Gross per 24 hour   Intake 1900 ml   Output 650 ml   Net 1250 ml      Physical Exam  Vitals signs and nursing note reviewed.   Constitutional:       General: He is not in acute distress.     Appearance: He is well-developed.   HENT:      Head: Normocephalic and atraumatic.   Eyes:      General:         Right eye: No discharge.         Left eye: No discharge.      Conjunctiva/sclera: Conjunctivae normal.   Cardiovascular:      Rate and Rhythm: Normal rate.      Pulses: Normal pulses.   Pulmonary:      Effort: Pulmonary effort is normal. No respiratory distress.   Abdominal:      General: Bowel sounds are decreased.      Palpations: Abdomen is soft.      Tenderness: There is abdominal tenderness.   Musculoskeletal: Normal range of motion.      Right lower leg: No edema.      Left lower leg: No edema.   Skin:     General: Skin is warm and dry.   Neurological:      Mental Status: He is alert and oriented to person, place, and time.       Significant Labs:   CBC:  Recent Labs   Lab 11/13/20  0601 11/14/20  0600 11/15/20  0624   WBC 9.13 15.12* 5.92   HGB 13.2* 13.6* 13.0*   HCT 38.6* 38.9* 36.2*   * 395* 323      BMP:  Recent Labs   Lab 11/13/20  0601 11/14/20  0600 11/15/20  0624    135* 136   K 4.1 4.5 3.7    100 99   CO2 20* 21* 24   BUN 8 11 9   CREATININE 0.9 1.0 1.1   GLU 87 101 83   CALCIUM 8.8 8.9 8.6*   PHOS 3.8 3.7 3.5     Significant Imaging:   No new imaging this  morning.      Assessment/Plan:      * SBO (small bowel obstruction)  Intraabdominal abscess  - s/p laparoscopic appendectomy for acute gangrenous appendicitis with associated abscess on 10/27/20.  - Surgery complicated by peritonitis s/p diagnostic laparoscopy with abdominal washout 10/29/20.  - CT Abd/Pelvis 11/07 showed SBO with transition point in R mid pelvis and collection vs abscess. Repeat CT 11/12 showing decrease in fluid collection size.  - Continuing empiric piperacillin-tazobactam 4.5g IV q8hr.  - Continuing clear liquids. Continuing IVFs with LR.  - Pain control improved; continuing hydrocodone-acetaminophen 5-325mg PO q4hr PRN, hydromorphone 0.5mg IV q3hr PRN for moderate/severe pain respectively.  - Continuing ketorolac 15mg IV q6hr as PRN; monitor renal function daily, continue famotidine 20mg IV BID for GI prophylaxis.  - Continue NGT to low-intermittent suction.  - Appreciate general surgery assistance.    VTE Risk Mitigation (From admission, onward)         Ordered     Place sequential compression device  Until discontinued      11/07/20 0836     IP VTE LOW RISK PATIENT  Once      11/07/20 0836                Discharge Planning   ITALIA: 11/11/2020     Code Status: Full Code   Is the patient medically ready for discharge?:     Reason for patient still in hospital (select all that apply): Treatment  Discharge Plan A: Home with family                  D Oren Loaiza MD  Department of Hospital Medicine   [facility]

## 2020-11-15 NOTE — PROGRESS NOTES
Feels better.  +flatus, BM  T 99.6  VSS  NG 600cc  Abd less distended, softer  WBC 5.9  Hct 36.2  Improved  Cont NG, NPO today.

## 2020-11-16 LAB
ALBUMIN SERPL BCP-MCNC: 3.2 G/DL (ref 3.5–5.2)
ANION GAP SERPL CALC-SCNC: 14 MMOL/L (ref 8–16)
BUN SERPL-MCNC: 7 MG/DL (ref 6–20)
CALCIUM SERPL-MCNC: 8.7 MG/DL (ref 8.7–10.5)
CHLORIDE SERPL-SCNC: 99 MMOL/L (ref 95–110)
CO2 SERPL-SCNC: 25 MMOL/L (ref 23–29)
CREAT SERPL-MCNC: 1.1 MG/DL (ref 0.5–1.4)
ERYTHROCYTE [DISTWIDTH] IN BLOOD BY AUTOMATED COUNT: 11.7 % (ref 11.5–14.5)
EST. GFR  (AFRICAN AMERICAN): >60 ML/MIN/1.73 M^2
EST. GFR  (NON AFRICAN AMERICAN): >60 ML/MIN/1.73 M^2
GLUCOSE SERPL-MCNC: 70 MG/DL (ref 70–110)
HCT VFR BLD AUTO: 38.2 % (ref 40–54)
HGB BLD-MCNC: 13 G/DL (ref 14–18)
MCH RBC QN AUTO: 30.1 PG (ref 27–31)
MCHC RBC AUTO-ENTMCNC: 34 G/DL (ref 32–36)
MCV RBC AUTO: 88 FL (ref 82–98)
PHOSPHATE SERPL-MCNC: 3.4 MG/DL (ref 2.7–4.5)
PLATELET # BLD AUTO: 339 K/UL (ref 150–350)
PMV BLD AUTO: 9.4 FL (ref 9.2–12.9)
POTASSIUM SERPL-SCNC: 3.7 MMOL/L (ref 3.5–5.1)
RBC # BLD AUTO: 4.32 M/UL (ref 4.6–6.2)
SODIUM SERPL-SCNC: 138 MMOL/L (ref 136–145)
WBC # BLD AUTO: 5.37 K/UL (ref 3.9–12.7)

## 2020-11-16 PROCEDURE — 25000003 PHARM REV CODE 250: Performed by: INTERNAL MEDICINE

## 2020-11-16 PROCEDURE — 85027 COMPLETE CBC AUTOMATED: CPT

## 2020-11-16 PROCEDURE — 11000001 HC ACUTE MED/SURG PRIVATE ROOM

## 2020-11-16 PROCEDURE — 63600175 PHARM REV CODE 636 W HCPCS: Performed by: INTERNAL MEDICINE

## 2020-11-16 PROCEDURE — 36415 COLL VENOUS BLD VENIPUNCTURE: CPT

## 2020-11-16 PROCEDURE — 80069 RENAL FUNCTION PANEL: CPT

## 2020-11-16 PROCEDURE — 99233 SBSQ HOSP IP/OBS HIGH 50: CPT | Mod: ,,, | Performed by: INTERNAL MEDICINE

## 2020-11-16 PROCEDURE — 99233 PR SUBSEQUENT HOSPITAL CARE,LEVL III: ICD-10-PCS | Mod: ,,, | Performed by: INTERNAL MEDICINE

## 2020-11-16 RX ORDER — CIPROFLOXACIN 500 MG/1
500 TABLET ORAL EVERY 12 HOURS
Status: DISCONTINUED | OUTPATIENT
Start: 2020-11-16 | End: 2020-11-17 | Stop reason: HOSPADM

## 2020-11-16 RX ORDER — METRONIDAZOLE 250 MG/1
500 TABLET ORAL EVERY 8 HOURS
Status: DISCONTINUED | OUTPATIENT
Start: 2020-11-16 | End: 2020-11-17 | Stop reason: HOSPADM

## 2020-11-16 RX ADMIN — FAMOTIDINE 20 MG: 10 INJECTION INTRAVENOUS at 08:11

## 2020-11-16 RX ADMIN — SODIUM CHLORIDE, SODIUM LACTATE, POTASSIUM CHLORIDE, AND CALCIUM CHLORIDE: .6; .31; .03; .02 INJECTION, SOLUTION INTRAVENOUS at 06:11

## 2020-11-16 RX ADMIN — CIPROFLOXACIN 500 MG: 500 TABLET, FILM COATED ORAL at 08:11

## 2020-11-16 RX ADMIN — KETOROLAC TROMETHAMINE 15 MG: 30 INJECTION, SOLUTION INTRAMUSCULAR; INTRAVENOUS at 12:11

## 2020-11-16 RX ADMIN — METRONIDAZOLE 500 MG: 250 TABLET ORAL at 09:11

## 2020-11-16 RX ADMIN — PIPERACILLIN AND TAZOBACTAM 4.5 G: 4; .5 INJECTION, POWDER, LYOPHILIZED, FOR SOLUTION INTRAVENOUS; PARENTERAL at 06:11

## 2020-11-16 NOTE — PLAN OF CARE
NGT discontinued by provider. 50ml greenish output noted to suction container. Denies nausea and vomiting. Clear liquid diet ordered and tolerated well. Denies pain or discomfort.

## 2020-11-16 NOTE — SUBJECTIVE & OBJECTIVE
Interval History: No acute events overnight. Feeling improved with less bloating. No other concerns at this time.    Review of Systems   Constitutional: Negative for chills and fever.   Respiratory: Negative for cough and shortness of breath.    Cardiovascular: Negative for chest pain and palpitations.   Gastrointestinal: Positive for abdominal pain, nausea and vomiting.     Objective:     Vital Signs (Most Recent):  Temp: 98.3 °F (36.8 °C) (11/15/20 1220)  Pulse: 68 (11/15/20 1220)  Resp: 18 (11/15/20 1220)  BP: 123/73 (11/15/20 1220)  SpO2: 97 % (11/15/20 1220) Vital Signs (24h Range):  Temp:  [97.6 °F (36.4 °C)-99.6 °F (37.6 °C)] 98.3 °F (36.8 °C)  Pulse:  [61-79] 68  Resp:  [16-18] 18  SpO2:  [96 %-98 %] 97 %  BP: (119-139)/(73-87) 123/73     Weight: 74.8 kg (165 lb)  Body mass index is 25.84 kg/m².    Intake/Output Summary (Last 24 hours) at 11/15/2020 1933  Last data filed at 11/15/2020 1548  Gross per 24 hour   Intake 1900 ml   Output 650 ml   Net 1250 ml      Physical Exam  Vitals signs and nursing note reviewed.   Constitutional:       General: He is not in acute distress.     Appearance: He is well-developed.   HENT:      Head: Normocephalic and atraumatic.   Eyes:      General:         Right eye: No discharge.         Left eye: No discharge.      Conjunctiva/sclera: Conjunctivae normal.   Cardiovascular:      Rate and Rhythm: Normal rate.      Pulses: Normal pulses.   Pulmonary:      Effort: Pulmonary effort is normal. No respiratory distress.   Abdominal:      General: Bowel sounds are decreased.      Palpations: Abdomen is soft.      Tenderness: There is abdominal tenderness.   Musculoskeletal: Normal range of motion.      Right lower leg: No edema.      Left lower leg: No edema.   Skin:     General: Skin is warm and dry.   Neurological:      Mental Status: He is alert and oriented to person, place, and time.       Significant Labs:   CBC:  Recent Labs   Lab 11/13/20  0601 11/14/20  0600 11/15/20  0624    WBC 9.13 15.12* 5.92   HGB 13.2* 13.6* 13.0*   HCT 38.6* 38.9* 36.2*   * 395* 323      BMP:  Recent Labs   Lab 11/13/20  0601 11/14/20  0600 11/15/20  0624    135* 136   K 4.1 4.5 3.7    100 99   CO2 20* 21* 24   BUN 8 11 9   CREATININE 0.9 1.0 1.1   GLU 87 101 83   CALCIUM 8.8 8.9 8.6*   PHOS 3.8 3.7 3.5     Significant Imaging:   No new imaging this morning.

## 2020-11-16 NOTE — ASSESSMENT & PLAN NOTE
Intraabdominal abscess  - s/p laparoscopic appendectomy for acute gangrenous appendicitis with associated abscess on 10/27/20.  - Surgery complicated by peritonitis s/p diagnostic laparoscopy with abdominal washout 10/29/20.  - CT Abd/Pelvis 11/07 showed SBO with transition point in R mid pelvis and collection vs abscess. Repeat CT 11/12 showing decrease in fluid collection size.  - Now that tolerating PO, convert from piperacillin-tazobactam to ciprofloxacin 500 mg PO q12hr, metronidazole 500 mg PO q8hr.  - Was NPO for NGT; improved drastically overnight. Resume clear liquids and advance to fulls later today if tolerating.  - Continuing hydrocodone-acetaminophen 5-325mg PO q4hr PRN, hydromorphone 0.5mg IV q3hr PRN for moderate/severe pain respectively.  - Appreciate general surgery assistance.

## 2020-11-16 NOTE — PROGRESS NOTES
[facility] Hospital Medicine  Progress Note    Patient Name: Giuseppe Valdez  MRN: [id number]  Patient Class: IP- Inpatient   Admission Date: 11/7/2020  Length of Stay: 9 days  Attending Physician: SHEA Loaiza MD  Primary Care Provider: Tano Benjamin MD        Subjective:     Principal Problem:SBO (small bowel obstruction)        HPI:  36 y/o M presents to the ED today complaining of abdominal pain. He was hospitalized in [place] 10/27-11/1 where he was diagnosed with gangrenous appendicitis s/p laparoscopic appendectomy on 10/27 complicated by peritonitis s/p exploratory laparoscopy on 10/29. After discharge he was improving until early this morning when he had sudden onset of severe lower abdominal pain. He denies associated fever, chills, nausea, vomiting or diarrhea. He has been taking augmentin since discharge. He took tramadol for the pain without improvement. Pain was rated 8-9/10 at its worst after tramadol so he presented to the ED for evaluation. His last BM was yesterday. In the ED he was found to have SBO with abscess so referred for admission.    He works as a  but currently on leave due to recent surgery. He denies any medical problems and takes no chronic medications.    Overview/Hospital Course:  Admitted with SBO and possible developing abscess.  Made NPO, started empiric piperacillin tazobactam and IVFs.  General surgery consulted.  Pain was difficult to control, and required multimodal approach.  IR consulted for potential drain placement, but fluid collection was too small.  Repeat CT abd/pelvis showed improvement in fluid collection size with empiric antibiotic therapy.  Bowel function was slow to improve, and he required NGT placement.  Had BM overnight 11/15 - 11/16 and drastic improvement in pain, and NGT removed.  Diet advanced.    Interval History: No acute events overnight.  Reports + BM, + flatus and drastic improvement in pain.  Anxious to advance diet and  pursue discharge from hospital if feasible.    Review of Systems   Constitutional: Negative for chills and fever.   Respiratory: Negative for cough and shortness of breath.    Cardiovascular: Negative for chest pain and palpitations.   Gastrointestinal: Positive for abdominal pain. Negative for nausea and vomiting.     Objective:     Vital Signs (Most Recent):  Temp: 97.7 °F (36.5 °C) (11/16/20 1529)  Pulse: 85 (11/16/20 1529)  Resp: 18 (11/16/20 1529)  BP: 131/80 (11/16/20 1529)  SpO2: 98 % (11/16/20 1529) Vital Signs (24h Range):  Temp:  [97.1 °F (36.2 °C)-98.8 °F (37.1 °C)] 97.7 °F (36.5 °C)  Pulse:  [60-85] 85  Resp:  [16-18] 18  SpO2:  [94 %-98 %] 98 %  BP: (131-150)/(80-85) 131/80     Weight: 74.8 kg (165 lb)  Body mass index is 25.84 kg/m².    Intake/Output Summary (Last 24 hours) at 11/16/2020 1736  Last data filed at 11/16/2020 0853  Gross per 24 hour   Intake 1588.33 ml   Output 600 ml   Net 988.33 ml      Physical Exam  Vitals signs and nursing note reviewed.   Constitutional:       General: He is not in acute distress.     Appearance: He is well-developed.   HENT:      Head: Normocephalic and atraumatic.   Eyes:      General:         Right eye: No discharge.         Left eye: No discharge.      Conjunctiva/sclera: Conjunctivae normal.   Cardiovascular:      Rate and Rhythm: Normal rate.      Pulses: Normal pulses.   Pulmonary:      Effort: Pulmonary effort is normal. No respiratory distress.   Abdominal:      General: Bowel sounds are normal.      Palpations: Abdomen is soft.      Tenderness: There is abdominal tenderness (mild).   Musculoskeletal: Normal range of motion.      Right lower leg: No edema.      Left lower leg: No edema.   Skin:     General: Skin is warm and dry.   Neurological:      Mental Status: He is alert and oriented to person, place, and time.       Significant Labs:   CBC:  Recent Labs   Lab 11/14/20  0600 11/15/20  0624 11/16/20  0437   WBC 15.12* 5.92 5.37   HGB 13.6* 13.0* 13.0*    HCT 38.9* 36.2* 38.2*   * 323 339      BMP:  Recent Labs   Lab 11/14/20  0600 11/15/20  0624 11/16/20  0437   * 136 138   K 4.5 3.7 3.7    99 99   CO2 21* 24 25   BUN 11 9 7   CREATININE 1.0 1.1 1.1    83 70   CALCIUM 8.9 8.6* 8.7   PHOS 3.7 3.5 3.4     Significant Imaging:   No new imaging this morning.      Assessment/Plan:      * SBO (small bowel obstruction)  Intraabdominal abscess  - s/p laparoscopic appendectomy for acute gangrenous appendicitis with associated abscess on 10/27/20.  - Surgery complicated by peritonitis s/p diagnostic laparoscopy with abdominal washout 10/29/20.  - CT Abd/Pelvis 11/07 showed SBO with transition point in R mid pelvis and collection vs abscess. Repeat CT 11/12 showing decrease in fluid collection size.  - Now that tolerating PO, convert from piperacillin-tazobactam to ciprofloxacin 500 mg PO q12hr, metronidazole 500 mg PO q8hr.  - Was NPO for NGT; improved drastically overnight. Resume clear liquids and advance to fulls later today if tolerating.  - Continuing hydrocodone-acetaminophen 5-325mg PO q4hr PRN, hydromorphone 0.5mg IV q3hr PRN for moderate/severe pain respectively.  - Appreciate general surgery assistance.    VTE Risk Mitigation (From admission, onward)         Ordered     Place sequential compression device  Until discontinued      11/07/20 0836     IP VTE LOW RISK PATIENT  Once      11/07/20 0836                Discharge Planning   ITALIA: 11/17/2020     Code Status: Full Code   Is the patient medically ready for discharge?:     Reason for patient still in hospital (select all that apply): Treatment  Discharge Plan A: Home with family                  D Oren Loaiza MD  Department of Hospital Medicine   [facility]

## 2020-11-16 NOTE — PHYSICIAN QUERY
PT Name: Giuseppe Valdez  MR #: [id number]   Bowel Obstruction Clarification   Alexandria Call RN, CCDS; [email or web address]    This form is a permanent document in the medical record.     Query Date: November 16, 2020  By submitting this query, we are merely seeking further clarification of documentation to reflect the severity of illness of your patient. Please utilize your independent clinical judgment when addressing the question(s) below.    The medical record reflects the following:   Indicators   Supporting Clinical Findings Location in Medical Record   x Bowel obstruction, intestinal obstruction, LBO or SBO documented SBO (small bowel obstruction)  Intraabdominal abscess    admitted with SBO and possible developing abscess    SBO possible ileus    Status post appendectomy for ruptured appendix, ileus versus partial small-bowel obstruction   Hosp PN 11/15      Hosp PN 11/13    Sx PN 11/12    Sx PN 11/18   x Radiology findings - CT Abd/Pelvis 11/07 showed SBO with transition point in R mid pelvis and collection vs abscess.    - Repeat CT 11/12 showing decrease in fluid collection size.   Hosp PN 11/13   xx Treatment/Medication +flatus, BM  Cont NG, NPO today.    - Continuing empiric piperacillin-tazobactam 4.5g IV q8hr.   Gen Sx PN 11/15      Hosp PN 11/13   x Procedure/Surgery hospitalized in [place] 10/27-11/1 where he was diagnosed with gangrenous appendicitis   s/p laparoscopic appendectomy on 10/27 complicated by peritonitis   s/p exploratory laparoscopy on 10/29.  H&P 11/7    Other       Provider, please further specify the bowel obstruction diagnosis:    -  Possible 2 part response -     [   ] Partial or incomplete intestinal obstruction, due to adhesions   [   ] Partial or incomplete intestinal obstruction, due to other - specify below.      - specify: _______________     [ X ] Partial or incomplete intestinal obstruction, unknown or unspecified etiology, unknown or unspecified if partial or  incomplete   [   ] Postoperative partial or incomplete intestinal obstruction, a complication of procedure   [   ] Postoperative partial or incomplete intestinal obstruction, not a complication of procedure   [   ] Other intestinal condition (please specify): _____________________   [   ]  Clinically Undetermined     Please document in your progress notes daily for the duration of treatment until resolved, and include in your discharge summary.

## 2020-11-16 NOTE — ASSESSMENT & PLAN NOTE
Intraabdominal abscess  - s/p laparoscopic appendectomy for acute gangrenous appendicitis with associated abscess on 10/27/20.  - Surgery complicated by peritonitis s/p diagnostic laparoscopy with abdominal washout 10/29/20.  - CT Abd/Pelvis 11/07 showed SBO with transition point in R mid pelvis and collection vs abscess. Repeat CT 11/12 showing decrease in fluid collection size.  - Continuing empiric piperacillin-tazobactam 4.5g IV q8hr.  - Continuing clear liquids. Continuing IVFs with LR.  - Pain control improved; continuing hydrocodone-acetaminophen 5-325mg PO q4hr PRN, hydromorphone 0.5mg IV q3hr PRN for moderate/severe pain respectively.  - Continuing ketorolac 15mg IV q6hr as PRN; monitor renal function daily, continue famotidine 20mg IV BID for GI prophylaxis.  - Continue NGT to low-intermittent suction.  - Appreciate general surgery assistance.

## 2020-11-16 NOTE — SUBJECTIVE & OBJECTIVE
Interval History: No acute events overnight.  Reports + BM, + flatus and drastic improvement in pain.  Anxious to advance diet and pursue discharge from hospital if feasible.    Review of Systems   Constitutional: Negative for chills and fever.   Respiratory: Negative for cough and shortness of breath.    Cardiovascular: Negative for chest pain and palpitations.   Gastrointestinal: Positive for abdominal pain. Negative for nausea and vomiting.     Objective:     Vital Signs (Most Recent):  Temp: 97.7 °F (36.5 °C) (11/16/20 1529)  Pulse: 85 (11/16/20 1529)  Resp: 18 (11/16/20 1529)  BP: 131/80 (11/16/20 1529)  SpO2: 98 % (11/16/20 1529) Vital Signs (24h Range):  Temp:  [97.1 °F (36.2 °C)-98.8 °F (37.1 °C)] 97.7 °F (36.5 °C)  Pulse:  [60-85] 85  Resp:  [16-18] 18  SpO2:  [94 %-98 %] 98 %  BP: (131-150)/(80-85) 131/80     Weight: 74.8 kg (165 lb)  Body mass index is 25.84 kg/m².    Intake/Output Summary (Last 24 hours) at 11/16/2020 1736  Last data filed at 11/16/2020 0853  Gross per 24 hour   Intake 1588.33 ml   Output 600 ml   Net 988.33 ml      Physical Exam  Vitals signs and nursing note reviewed.   Constitutional:       General: He is not in acute distress.     Appearance: He is well-developed.   HENT:      Head: Normocephalic and atraumatic.   Eyes:      General:         Right eye: No discharge.         Left eye: No discharge.      Conjunctiva/sclera: Conjunctivae normal.   Cardiovascular:      Rate and Rhythm: Normal rate.      Pulses: Normal pulses.   Pulmonary:      Effort: Pulmonary effort is normal. No respiratory distress.   Abdominal:      General: Bowel sounds are normal.      Palpations: Abdomen is soft.      Tenderness: There is abdominal tenderness (mild).   Musculoskeletal: Normal range of motion.      Right lower leg: No edema.      Left lower leg: No edema.   Skin:     General: Skin is warm and dry.   Neurological:      Mental Status: He is alert and oriented to person, place, and time.        Significant Labs:   CBC:  Recent Labs   Lab 11/14/20  0600 11/15/20  0624 11/16/20  0437   WBC 15.12* 5.92 5.37   HGB 13.6* 13.0* 13.0*   HCT 38.9* 36.2* 38.2*   * 323 339      BMP:  Recent Labs   Lab 11/14/20  0600 11/15/20  0624 11/16/20  0437   * 136 138   K 4.5 3.7 3.7    99 99   CO2 21* 24 25   BUN 11 9 7   CREATININE 1.0 1.1 1.1    83 70   CALCIUM 8.9 8.6* 8.7   PHOS 3.7 3.5 3.4     Significant Imaging:   No new imaging this morning.

## 2020-11-17 VITALS
RESPIRATION RATE: 14 BRPM | OXYGEN SATURATION: 97 % | HEIGHT: 67 IN | TEMPERATURE: 98 F | WEIGHT: 165 LBS | HEART RATE: 73 BPM | SYSTOLIC BLOOD PRESSURE: 109 MMHG | DIASTOLIC BLOOD PRESSURE: 64 MMHG | BODY MASS INDEX: 25.9 KG/M2

## 2020-11-17 PROBLEM — T81.43XA POSTPROCEDURAL INTRAABDOMINAL ABSCESS: Status: RESOLVED | Noted: 2020-11-07 | Resolved: 2020-11-17

## 2020-11-17 PROBLEM — K56.609 SBO (SMALL BOWEL OBSTRUCTION): Status: RESOLVED | Noted: 2020-11-07 | Resolved: 2020-11-17

## 2020-11-17 LAB
ALBUMIN SERPL BCP-MCNC: 3.2 G/DL (ref 3.5–5.2)
ANION GAP SERPL CALC-SCNC: 11 MMOL/L (ref 8–16)
BUN SERPL-MCNC: 5 MG/DL (ref 6–20)
CALCIUM SERPL-MCNC: 9 MG/DL (ref 8.7–10.5)
CHLORIDE SERPL-SCNC: 103 MMOL/L (ref 95–110)
CO2 SERPL-SCNC: 26 MMOL/L (ref 23–29)
CREAT SERPL-MCNC: 1.1 MG/DL (ref 0.5–1.4)
ERYTHROCYTE [DISTWIDTH] IN BLOOD BY AUTOMATED COUNT: 11.9 % (ref 11.5–14.5)
EST. GFR  (AFRICAN AMERICAN): >60 ML/MIN/1.73 M^2
EST. GFR  (NON AFRICAN AMERICAN): >60 ML/MIN/1.73 M^2
GLUCOSE SERPL-MCNC: 95 MG/DL (ref 70–110)
HCT VFR BLD AUTO: 38.7 % (ref 40–54)
HGB BLD-MCNC: 13.2 G/DL (ref 14–18)
MCH RBC QN AUTO: 30.1 PG (ref 27–31)
MCHC RBC AUTO-ENTMCNC: 34.1 G/DL (ref 32–36)
MCV RBC AUTO: 88 FL (ref 82–98)
PHOSPHATE SERPL-MCNC: 4.1 MG/DL (ref 2.7–4.5)
PLATELET # BLD AUTO: 298 K/UL (ref 150–350)
PMV BLD AUTO: 9.3 FL (ref 9.2–12.9)
POTASSIUM SERPL-SCNC: 3.5 MMOL/L (ref 3.5–5.1)
RBC # BLD AUTO: 4.39 M/UL (ref 4.6–6.2)
SODIUM SERPL-SCNC: 140 MMOL/L (ref 136–145)
WBC # BLD AUTO: 6.07 K/UL (ref 3.9–12.7)

## 2020-11-17 PROCEDURE — 80069 RENAL FUNCTION PANEL: CPT

## 2020-11-17 PROCEDURE — 99238 PR HOSPITAL DISCHARGE DAY,<30 MIN: ICD-10-PCS | Mod: ,,, | Performed by: INTERNAL MEDICINE

## 2020-11-17 PROCEDURE — 85027 COMPLETE CBC AUTOMATED: CPT

## 2020-11-17 PROCEDURE — 36415 COLL VENOUS BLD VENIPUNCTURE: CPT

## 2020-11-17 PROCEDURE — 25000003 PHARM REV CODE 250: Performed by: INTERNAL MEDICINE

## 2020-11-17 PROCEDURE — 99238 HOSP IP/OBS DSCHRG MGMT 30/<: CPT | Mod: ,,, | Performed by: INTERNAL MEDICINE

## 2020-11-17 PROCEDURE — 94761 N-INVAS EAR/PLS OXIMETRY MLT: CPT

## 2020-11-17 RX ORDER — CIPROFLOXACIN 500 MG/1
500 TABLET ORAL EVERY 12 HOURS
Qty: 8 TABLET | Refills: 0 | Status: SHIPPED | OUTPATIENT
Start: 2020-11-17 | End: 2020-11-21

## 2020-11-17 RX ORDER — KETOROLAC TROMETHAMINE 10 MG/1
10 TABLET, FILM COATED ORAL EVERY 6 HOURS PRN
Qty: 20 TABLET | Refills: 0 | Status: SHIPPED | OUTPATIENT
Start: 2020-11-17 | End: 2020-11-22

## 2020-11-17 RX ORDER — METRONIDAZOLE 500 MG/1
500 TABLET ORAL EVERY 8 HOURS
Qty: 12 TABLET | Refills: 0 | Status: SHIPPED | OUTPATIENT
Start: 2020-11-17 | End: 2020-11-21

## 2020-11-17 RX ADMIN — METRONIDAZOLE 500 MG: 250 TABLET ORAL at 05:11

## 2020-11-17 RX ADMIN — CIPROFLOXACIN 500 MG: 500 TABLET, FILM COATED ORAL at 08:11

## 2020-11-17 NOTE — DISCHARGE INSTRUCTIONS
November 17, 2020         [address]  [address]  [facility] [address]  Phone: [phone]  Fax: [phone]       Patient: Giuseppe Valdez   YOB: 1985  Date of Visit: 11/17/2020    To Whom It May Concern:    Meka Valdez  was under care at [facility] from 11/07/2020 to 11/17/2020. If you have any questions or concerns, or if I can be of further assistance, please do not hesitate to contact me.    Sincerely,      Flor Galvan MD  [facility]  [facility]

## 2020-11-17 NOTE — DISCHARGE SUMMARY
[facility] Hospital Medicine  Discharge Summary      Patient Name: Giuseppe Valdez  MRN: [id number]  Admission Date: 11/7/2020  Hospital Length of Stay: 10 days  Discharge Date and Time:  11/17/2020 9:23 AM  Attending Physician: Flor Galvan MD   Discharging Provider: Flor Galvan MD  Primary Care Provider: Tano Benjamin MD      HPI:   34 y/o M presents to the ED today complaining of abdominal pain. He was hospitalized in [place] 10/27-11/1 where he was diagnosed with gangrenous appendicitis s/p laparoscopic appendectomy on 10/27 complicated by peritonitis s/p exploratory laparoscopy on 10/29. After discharge he was improving until early this morning when he had sudden onset of severe lower abdominal pain. He denies associated fever, chills, nausea, vomiting or diarrhea. He has been taking augmentin since discharge. He took tramadol for the pain without improvement. Pain was rated 8-9/10 at its worst after tramadol so he presented to the ED for evaluation. His last BM was yesterday. In the ED he was found to have SBO with abscess so referred for admission.    He works as a  but currently on leave due to recent surgery. He denies any medical problems and takes no chronic medications.    * No surgery found *      Hospital Course:   Admitted with SBO and possible developing abscess.  Made NPO, started empiric piperacillin tazobactam and IVFs.  General surgery consulted.  Pain was difficult to control, and required multimodal approach.  IR consulted for potential drain placement, but fluid collection was too small.  Repeat CT abd/pelvis showed improvement in fluid collection size with empiric antibiotic therapy.  Bowel function was slow to improve, and he required NGT placement.  Had BM overnight 11/15 - 11/16 and drastic improvement in pain, and NGT removed.  Diet advanced and he tolerated without issue. He is now stable and ready for discharge home today.      Consults:   Consults (From  admission, onward)        Status Ordering Provider     Inpatient consult to Interventional Radiology  Once     Provider:  (Not yet assigned)    Completed JONH WHEELER          No new Assessment & Plan notes have been filed under this hospital service since the last note was generated.  Service: Hospital Medicine    Final Active Diagnoses:      Problems Resolved During this Admission:    Diagnosis Date Noted Date Resolved POA    PRINCIPAL PROBLEM:  SBO (small bowel obstruction) [K56.609] 11/07/2020 11/17/2020 Yes    Postprocedural intraabdominal abscess [T81.43XA] 11/07/2020 11/17/2020 Yes    Elevated LFTs [R79.89] 11/07/2020 11/09/2020 Yes       Discharged Condition: good    Disposition: Home or Self Care    Follow Up:  Follow-up Information     Tano Benjamin MD In 2 weeks.    Specialty: Internal Medicine  Why: hospital follow-up  Contact information:  [address]  [facility] [address]  [phone]                 Patient Instructions:      Diet Adult Regular     Lifting restrictions     Activity as tolerated       Significant Diagnostic Studies: Labs: All labs within the past 24 hours have been reviewed    Pending Diagnostic Studies:     None         Medications:  Reconciled Home Medications:      Medication List      START taking these medications    ciprofloxacin HCl 500 MG tablet  Commonly known as: CIPRO  Take 1 tablet (500 mg total) by mouth every 12 (twelve) hours. for 4 days     ketorolac 10 mg tablet  Commonly known as: TORADOL  Take 1 tablet (10 mg total) by mouth every 6 (six) hours as needed for Pain.     metroNIDAZOLE 500 MG tablet  Commonly known as: FLAGYL  Take 1 tablet (500 mg total) by mouth every 8 (eight) hours. for 4 days        STOP taking these medications    amoxicillin-clavulanate 875-125mg 875-125 mg per tablet  Commonly known as: AUGMENTIN     ibuprofen 600 MG tablet  Commonly known as: ADVIL,MOTRIN     REFRESH TEARS 0.5 % Drop  Generic drug: carboxymethylcellulose sodium      traMADoL 50 mg tablet  Commonly known as: ULTRAM            Indwelling Lines/Drains at time of discharge:   Lines/Drains/Airways     None                 Time spent on the discharge of patient: 30 minutes  Patient was seen and examined on the date of discharge and determined to be suitable for discharge.         Flor Galvan MD  Department of Hospital Medicine  [facility]

## 2020-11-17 NOTE — NURSING
Denies nausea/vomiting. Tolerated full liquid diet. Diet to advance to low fiber/residue in AM. Abdomen non-distended. LBM today. voids to urinal. Has new order for PO antibiotics. Right forearm IV site intact. Ambulates in cohen without difficulty. Purposeful hourly rounding performed. Bed in lowest position. Call button in reach.

## 2020-11-17 NOTE — PLAN OF CARE
Patient is discharged home with self care.     No CM needs for discharge.     Family will transport the patient home.     11/17/20 0929   Final Note   Assessment Type Final Discharge Note   Anticipated Discharge Disposition Home   What phone number can be called within the next 1-3 days to see how you are doing after discharge? [phone]   Right Care Referral Info   Post Acute Recommendation No Care

## 2020-11-17 NOTE — PLAN OF CARE
Pt remains free from injury or falls. Vital signs stable throughout night on room air. Positions self independently. No complaints of pain or nausea.  Tolerating PO antibiotics. Bed in low locked position and call light within reach.  Will continue to monitor.

## 2020-11-17 NOTE — PHYSICIAN QUERY
"PT Name: Giuseppe Valdez  MR #: [id number]    Postoperative Relationship Clarification   Alexandria Call RN, CCDS; [email or web address]    This form is a permanent document in the medical record.    Query Date: November 17, 2020    Dear Provider,  By submitting this query, we are merely seeking further clarification of documentation.   Please utilize your independent clinical judgment when addressing the question(s) below.    Supporting Clinical Findings Location in Medical Record   Postprocedural intraabdominal abscess    hospitalized in [place] 10/27-11/1 where he was diagnosed with gangrenous appendicitis s/p laparoscopic appendectomy on 10/27 complicated by peritonitis s/p exploratory laparoscopy on 10/29.   In the ED he was found to have SBO with abscess so referred for admission.   DCS   SBO (small bowel obstruction)  Intraabdominal abscess  - s/p laparoscopic appendectomy for acute gangrenous appendicitis with associated abscess on 10/27/20  - Surgery complicated by peritonitis s/p diagnostic laparoscopy with abdominal washout 10/29/20  - CT abd/pelvis shows SBO with transition point in the R mid pelvis and a fluid collection consistent with abscess  - WBC 16  - NPO, IV fluids  - Ketorolac and dilaudid PRN for pain control  - Start empiric Zosyn 4.5 mg IV q8h  - General surgery consulted H&P     Please clarify the term postprocedural as it relates to "Postprocedural intraabdominal abscess"    [   ] Condition occurred in the postoperative period (not a complication of surgery)   [ x  ] Condition is a complication of surgery   [   ] Other intended meaning (please specify): ____________________________   [  ] Clinically Undetermined          "

## 2020-11-19 ENCOUNTER — PATIENT OUTREACH (OUTPATIENT)
Dept: ADMINISTRATIVE | Facility: CLINIC | Age: 35
End: 2020-11-19

## 2020-11-19 NOTE — PATIENT INSTRUCTIONS
Small Bowel Obstruction     Small bowel obstruction can lead to tissue damage and even tissue death.   A small bowel obstruction occurs when part or all of the small intestine (bowel) is blocked. As a result, digestive contents cant move through the bowel properly and out of the body. Treatment is needed right away to remove the blockage. This can ease painful symptoms. It can also prevent serious problems, such as tissue death or bursting (rupture) of the small bowel. Without treatment, a small bowel obstruction can be fatal.  Causes of small bowel obstruction  A small bowel obstruction can be caused by:  · Scar tissue (adhesions). These may form after belly (abdominal) surgery or an infection.  · Hernia. A hernia is when an organ pushes through a weak spot or tear in the abdomen wall. Part of the small bowel can push out and be seen as a bulge under the belly. Hernias can also occur internally.  · Certain health problems. These include when part of the bowel slides inside another part (intussusception). Other causes include irritable bowel disease such as Crohns disease, and inflammation and sores in the intestine (ulcerative colitis).  · Abnormal tissue growths (tumors). These can form on the inside or outside of the small bowel. They are usually due to cancer.  Symptoms of small bowel obstruction  Common symptoms include:  · Belly cramping and pain  · Belly swelling and bloating  · Upset stomach (nausea) and vomiting  · Can't  pass gas  · Can't pass stool (constipation)  · Diarrhea  Diagnosing small bowel obstruction  Your provider will ask about your symptoms and health history. Youll also have a physical exam. Tests may also be done to confirm the problem. These can include:  · Imaging tests. These provide pictures of the small bowel. Common tests include X-rays and a CT scan.  · Blood tests. These check for infection and other problems, such as excess fluid loss (dehydration).  · Upper GI  (gastrointestinal) series with a small bowel follow-through. This test takes X-rays of the upper digestive tract from the mouth through the small bowel. An X-ray dye (contrast fluid) is used. The dye coats the inside of your upper digestive tract so it will show up clearly on X-rays.  Treating small bowel obstruction  Treatment takes place in a hospital. As part of your care, the following may be done:  · No food or drink is given by mouth. This allows your bowels to rest.  · An IV (intravenous) line is placed in a vein in your arm or hand. The IV line is used to give fluids. It may also be used to give medicines. These may be needed to ease pain, nausea, and other symptoms. They may also be needed to treat or prevent infections.  · A soft, thin, flexible tube (nasogastric tube) is inserted through your nose and into your stomach. The tube is used to remove extra gas and fluid in your stomach and bowels. This helps to ease symptoms such as pain and swelling.  · In severe cases, surgery is done. This may be needed if the small bowel is almost or totally blocked, or there is a hole in the bowel (bowel perforation). During surgery, the blockage is removed. Parts of the bowel may also be removed if there is tissue death. Other repair may be done as well, depending on what caused the blockage. Your healthcare provider will give you more information about surgery, if needed.  · Youll be watched closely in the hospital until your symptoms improve. Your provider will tell you when you can go home.  Long-term concerns   After treatment, most people recover with no lasting effects. If a long part of the bowel is removed, there is a greater chance for lifelong digestive problems. Bowel movements may become irregular. Work with your provider to learn the best ways to manage any symptoms you may have, and to protect your health.  When to call your healthcare provider  Call your provider right away if you have any of the  following:  · Severe pain (Call 911)  · Belly swelling or cramping that wont go away  · Cant pass stool or gas  · Nausea or vomiting (especially if the vomit looks or smells like stool)   Date Last Reviewed: 7/1/2020 © 2000-2020 [employer]. [address] [place] [address], [place] [address]. All rights reserved. This information is not intended as a substitute for professional medical care. Always follow your healthcare professional's instructions.

## 2020-11-23 ENCOUNTER — LAB VISIT (OUTPATIENT)
Dept: LAB | Facility: HOSPITAL | Age: 35
End: 2020-11-23
Payer: OTHER GOVERNMENT

## 2020-11-23 ENCOUNTER — OFFICE VISIT (OUTPATIENT)
Dept: INTERNAL MEDICINE | Facility: CLINIC | Age: 35
End: 2020-11-23
Payer: OTHER GOVERNMENT

## 2020-11-23 VITALS
DIASTOLIC BLOOD PRESSURE: 82 MMHG | HEART RATE: 87 BPM | BODY MASS INDEX: 25.08 KG/M2 | WEIGHT: 156.06 LBS | SYSTOLIC BLOOD PRESSURE: 120 MMHG | OXYGEN SATURATION: 97 % | HEIGHT: 66 IN

## 2020-11-23 DIAGNOSIS — Z13.220 SCREENING CHOLESTEROL LEVEL: ICD-10-CM

## 2020-11-23 DIAGNOSIS — Z01.89 ROUTINE LAB DRAW: ICD-10-CM

## 2020-11-23 DIAGNOSIS — Z87.19 HISTORY OF SMALL BOWEL OBSTRUCTION: Primary | ICD-10-CM

## 2020-11-23 DIAGNOSIS — Z11.4 ENCOUNTER FOR SCREENING FOR HIV: ICD-10-CM

## 2020-11-23 DIAGNOSIS — Z11.59 ENCOUNTER FOR HEPATITIS C SCREENING TEST FOR LOW RISK PATIENT: ICD-10-CM

## 2020-11-23 DIAGNOSIS — K35.33 APPENDIX ABSCESS: ICD-10-CM

## 2020-11-23 LAB
ALBUMIN SERPL BCP-MCNC: 3.7 G/DL (ref 3.5–5.2)
ALP SERPL-CCNC: 75 U/L (ref 55–135)
ALT SERPL W/O P-5'-P-CCNC: 35 U/L (ref 10–44)
ANION GAP SERPL CALC-SCNC: 10 MMOL/L (ref 8–16)
AST SERPL-CCNC: 29 U/L (ref 10–40)
BASOPHILS # BLD AUTO: 0.04 K/UL (ref 0–0.2)
BASOPHILS NFR BLD: 0.5 % (ref 0–1.9)
BILIRUB SERPL-MCNC: 0.2 MG/DL (ref 0.1–1)
BUN SERPL-MCNC: 14 MG/DL (ref 6–20)
CALCIUM SERPL-MCNC: 8.9 MG/DL (ref 8.7–10.5)
CHLORIDE SERPL-SCNC: 109 MMOL/L (ref 95–110)
CHOLEST SERPL-MCNC: 142 MG/DL (ref 120–199)
CHOLEST/HDLC SERPL: 4.4 {RATIO} (ref 2–5)
CO2 SERPL-SCNC: 24 MMOL/L (ref 23–29)
CREAT SERPL-MCNC: 1 MG/DL (ref 0.5–1.4)
DIFFERENTIAL METHOD: ABNORMAL
EOSINOPHIL # BLD AUTO: 0.3 K/UL (ref 0–0.5)
EOSINOPHIL NFR BLD: 4.2 % (ref 0–8)
ERYTHROCYTE [DISTWIDTH] IN BLOOD BY AUTOMATED COUNT: 12.5 % (ref 11.5–14.5)
EST. GFR  (AFRICAN AMERICAN): >60 ML/MIN/1.73 M^2
EST. GFR  (NON AFRICAN AMERICAN): >60 ML/MIN/1.73 M^2
GLUCOSE SERPL-MCNC: 80 MG/DL (ref 70–110)
HCT VFR BLD AUTO: 42.6 % (ref 40–54)
HDLC SERPL-MCNC: 32 MG/DL (ref 40–75)
HDLC SERPL: 22.5 % (ref 20–50)
HGB BLD-MCNC: 13.8 G/DL (ref 14–18)
IMM GRANULOCYTES # BLD AUTO: 0.01 K/UL (ref 0–0.04)
IMM GRANULOCYTES NFR BLD AUTO: 0.1 % (ref 0–0.5)
LDLC SERPL CALC-MCNC: 78.4 MG/DL (ref 63–159)
LYMPHOCYTES # BLD AUTO: 3.3 K/UL (ref 1–4.8)
LYMPHOCYTES NFR BLD: 43.1 % (ref 18–48)
MCH RBC QN AUTO: 30.8 PG (ref 27–31)
MCHC RBC AUTO-ENTMCNC: 32.4 G/DL (ref 32–36)
MCV RBC AUTO: 95 FL (ref 82–98)
MONOCYTES # BLD AUTO: 1.2 K/UL (ref 0.3–1)
MONOCYTES NFR BLD: 15.3 % (ref 4–15)
NEUTROPHILS # BLD AUTO: 2.8 K/UL (ref 1.8–7.7)
NEUTROPHILS NFR BLD: 36.8 % (ref 38–73)
NONHDLC SERPL-MCNC: 110 MG/DL
NRBC BLD-RTO: 0 /100 WBC
PLATELET # BLD AUTO: 274 K/UL (ref 150–350)
PMV BLD AUTO: 10.2 FL (ref 9.2–12.9)
POTASSIUM SERPL-SCNC: 4 MMOL/L (ref 3.5–5.1)
PROT SERPL-MCNC: 7.2 G/DL (ref 6–8.4)
RBC # BLD AUTO: 4.48 M/UL (ref 4.6–6.2)
SODIUM SERPL-SCNC: 143 MMOL/L (ref 136–145)
TRIGL SERPL-MCNC: 158 MG/DL (ref 30–150)
TSH SERPL DL<=0.005 MIU/L-ACNC: 1.13 UIU/ML (ref 0.4–4)
WBC # BLD AUTO: 7.63 K/UL (ref 3.9–12.7)

## 2020-11-23 PROCEDURE — 99215 PR OFFICE/OUTPT VISIT, EST, LEVL V, 40-54 MIN: ICD-10-PCS | Mod: S$PBB,,, | Performed by: INTERNAL MEDICINE

## 2020-11-23 PROCEDURE — 80061 LIPID PANEL: CPT

## 2020-11-23 PROCEDURE — 80053 COMPREHEN METABOLIC PANEL: CPT

## 2020-11-23 PROCEDURE — 86803 HEPATITIS C AB TEST: CPT

## 2020-11-23 PROCEDURE — 99999 PR PBB SHADOW E&M-EST. PATIENT-LVL III: ICD-10-PCS | Mod: PBBFAC,,, | Performed by: INTERNAL MEDICINE

## 2020-11-23 PROCEDURE — 85025 COMPLETE CBC W/AUTO DIFF WBC: CPT

## 2020-11-23 PROCEDURE — 86703 HIV-1/HIV-2 1 RESULT ANTBDY: CPT

## 2020-11-23 PROCEDURE — 99213 OFFICE O/P EST LOW 20 MIN: CPT | Mod: PBBFAC | Performed by: INTERNAL MEDICINE

## 2020-11-23 PROCEDURE — 36415 COLL VENOUS BLD VENIPUNCTURE: CPT

## 2020-11-23 PROCEDURE — 99999 PR PBB SHADOW E&M-EST. PATIENT-LVL III: CPT | Mod: PBBFAC,,, | Performed by: INTERNAL MEDICINE

## 2020-11-23 PROCEDURE — 84443 ASSAY THYROID STIM HORMONE: CPT

## 2020-11-23 PROCEDURE — 99215 OFFICE O/P EST HI 40 MIN: CPT | Mod: S$PBB,,, | Performed by: INTERNAL MEDICINE

## 2020-11-23 NOTE — LETTER
November 23, 2020    Giuseppe Valdez  [address] [address] [address]             [facility]  [address]  [facility] [address]  Phone: [phone]  Fax: [phone] To Whom This May Concern,    Mr. Giuseppe Valdez was seen and assessed by me on 11/23/2020. He continues to recover from complicated course of acute appendicitis. He  may return to work on 12/1/2020 without restrictions.    Sincerely,        Tano Benjamin MD  Internal Medicine

## 2020-11-23 NOTE — PROGRESS NOTES
"    CHIEF COMPLAINT     Chief Complaint   Patient presents with    Establish Care     clearance to go back to work       HPI     Giuseppe Valdez is a 35 y.o. male here today for hospital follow-up.  Patient had appendicitis at end of October and was treated acutely in [place].  An outside hospital he underwent laparoscopic appendectomy that was complicated by peritonitis status post ex lap.  Patient was admitted [facility] from on November 7th to November 17th.  Discharge summary personally reviewed notable for the following.  Patient presented to [facility] with nausea and vomiting was found to have small-bowel obstruction and early abscess formation.  Patient was evaluated by general surgery and IR unable to drain small fluid collection do list size.  Was started on antibiotics and had NG tube placed.  Patient had bowel movement on November 15 and had NG tube removed.  He was discharged on ciprofloxacin and Flagyl.        Personally Reviewed Patient's Medical, surgical, family and social hx. Changes updated in [facility].  Care Team updated in Epic    Review of Systems:  Review of Systems   Constitutional: Negative for fever.   Gastrointestinal: Negative for abdominal pain, constipation, nausea and vomiting.   All other systems reviewed and are negative.      Health Maintenance:   Reviewed with patient  Due for the following:      PHYSICAL EXAM     /82 (BP Location: Left arm, Patient Position: Sitting, BP Method: Medium (Manual))   Pulse 87   Ht 5' 6" (1.676 m)   Wt 70.8 kg (156 lb 1.4 oz)   SpO2 97%   BMI 25.19 kg/m²     Gen: Well Appearing, NAD  HEENT: PERR, EOMI  Neck: FROM, no thyromegaly, no cervical adenopathy  CVD: RRR, no M/R/G  Pulm: Normal work of breathing, CTAB, no wheezing  Abd:  Soft, NT, ND non TTP, no mass, 3 lap port holes healing  MSK: no LE edema  Neuro: A&Ox3, gait normal, speech normal  Mood; Mood normal, behavior normal, thought process linear       LABS     Labs reviewed; Notable for  Lab " Results   Component Value Date    CREATININE 1.1 11/17/2020    BUN 5 (L) 11/17/2020     11/17/2020    K 3.5 11/17/2020     11/17/2020    CO2 26 11/17/2020     Lab Results   Component Value Date    WBC 6.07 11/17/2020    HGB 13.2 (L) 11/17/2020    HCT 38.7 (L) 11/17/2020    MCV 88 11/17/2020     11/17/2020           CT/AP  11/7  .  Focally dilated loops of small bowel within the pelvis with apparent focal transition point in the right mid pelvis concerning for small bowel obstruction.  There is mild adjacent mesenteric edema.  This is nonspecific in this patient with reported history of recent perforated appendicitis/peritonitis, however component of bowel ischemia is not excluded.     2.  Postoperative changes of recent appendectomy.  There is a small peripherally enhancing fluid collection within the posterior pelvis which may represent an abscess.  Correlation with any prior outside imaging is advised.     3.  Slight wall thickening of the rectosigmoid colon, favored to be reactive in etiology.  ASSESSMENT     1. History of small bowel obstruction     2. Appendix abscess             Plan     Giuseppe Valdez is a 35 y.o. male with no significant past medical history here for hospital follow-up with recent complicated appendicitis complicated by abscess and small bowel obstruction.  Patient was hospitalized had NG tube placed started on appropriate antibiotics.  Symptoms have resolved.    1. Appendix abscess  New problem, resolved  Clinically resolved that is post treatment with Cipro Flexeril.    2. History of small bowel obstruction  Has resolved.    Tano Benjamin MD

## 2020-11-24 LAB
HCV AB SERPL QL IA: NEGATIVE
HIV 1+2 AB+HIV1 P24 AG SERPL QL IA: NEGATIVE

## 2020-12-18 ENCOUNTER — PATIENT MESSAGE (OUTPATIENT)
Dept: INTERNAL MEDICINE | Facility: CLINIC | Age: 35
End: 2020-12-18

## 2020-12-30 ENCOUNTER — PATIENT MESSAGE (OUTPATIENT)
Dept: INTERNAL MEDICINE | Facility: CLINIC | Age: 35
End: 2020-12-30

## 2021-02-08 PROBLEM — Z09 STATUS POST APPENDECTOMY, FOLLOW-UP EXAM: Status: RESOLVED | Noted: 2020-11-04 | Resolved: 2021-02-08

## 2022-03-16 ENCOUNTER — PATIENT MESSAGE (OUTPATIENT)
Dept: ADMINISTRATIVE | Facility: HOSPITAL | Age: 37
End: 2022-03-16
Payer: OTHER GOVERNMENT

## 2022-03-29 ENCOUNTER — PATIENT OUTREACH (OUTPATIENT)
Dept: ADMINISTRATIVE | Facility: HOSPITAL | Age: 37
End: 2022-03-29
Payer: OTHER GOVERNMENT

## 2022-03-29 NOTE — PROGRESS NOTES
Patient moved out of area and has new PCP      Marianna Call LPN   Clinical Care Coordinator  Primary Care and Wellness

## 2023-11-27 ENCOUNTER — OFFICE VISIT (OUTPATIENT)
Dept: URGENT CARE | Facility: CLINIC | Age: 38
End: 2023-11-27
Payer: OTHER GOVERNMENT

## 2023-11-27 VITALS
OXYGEN SATURATION: 97 % | BODY MASS INDEX: 27.32 KG/M2 | TEMPERATURE: 99 F | RESPIRATION RATE: 19 BRPM | WEIGHT: 170 LBS | HEART RATE: 60 BPM | HEIGHT: 66 IN | SYSTOLIC BLOOD PRESSURE: 133 MMHG | DIASTOLIC BLOOD PRESSURE: 82 MMHG

## 2023-11-27 DIAGNOSIS — J20.8 ACUTE BACTERIAL BRONCHITIS: Primary | ICD-10-CM

## 2023-11-27 DIAGNOSIS — B96.89 ACUTE BACTERIAL BRONCHITIS: Primary | ICD-10-CM

## 2023-11-27 DIAGNOSIS — R05.1 ACUTE COUGH: ICD-10-CM

## 2023-11-27 LAB
CTP QC/QA: YES
RSV RAPID ANTIGEN: NEGATIVE

## 2023-11-27 PROCEDURE — 71046 X-RAY EXAM CHEST 2 VIEWS: CPT | Mod: S$GLB,,, | Performed by: RADIOLOGY

## 2023-11-27 PROCEDURE — 87807 POCT RESPIRATORY SYNCYTIAL VIRUS: ICD-10-PCS | Mod: QW,S$GLB,, | Performed by: NURSE PRACTITIONER

## 2023-11-27 PROCEDURE — 71046 XR CHEST PA AND LATERAL: ICD-10-PCS | Mod: S$GLB,,, | Performed by: RADIOLOGY

## 2023-11-27 PROCEDURE — 99203 PR OFFICE/OUTPT VISIT, NEW, LEVL III, 30-44 MIN: ICD-10-PCS | Mod: S$GLB,,, | Performed by: NURSE PRACTITIONER

## 2023-11-27 PROCEDURE — 87807 RSV ASSAY W/OPTIC: CPT | Mod: QW,S$GLB,, | Performed by: NURSE PRACTITIONER

## 2023-11-27 PROCEDURE — 99203 OFFICE O/P NEW LOW 30 MIN: CPT | Mod: S$GLB,,, | Performed by: NURSE PRACTITIONER

## 2023-11-27 RX ORDER — LORATADINE 10 MG/1
10 TABLET ORAL DAILY
Qty: 7 TABLET | Refills: 0 | Status: SHIPPED | OUTPATIENT
Start: 2023-11-27 | End: 2023-12-04

## 2023-11-27 RX ORDER — DOXYCYCLINE 100 MG/1
100 CAPSULE ORAL 2 TIMES DAILY
Qty: 14 CAPSULE | Refills: 0 | Status: SHIPPED | OUTPATIENT
Start: 2023-11-27 | End: 2023-12-04

## 2023-11-27 RX ORDER — ALBUTEROL SULFATE 90 UG/1
2 AEROSOL, METERED RESPIRATORY (INHALATION) EVERY 6 HOURS PRN
Qty: 18 G | Refills: 0 | Status: SHIPPED | OUTPATIENT
Start: 2023-11-27 | End: 2024-11-26

## 2023-11-27 RX ORDER — PREDNISONE 20 MG/1
20 TABLET ORAL DAILY
Qty: 3 TABLET | Refills: 0 | Status: SHIPPED | OUTPATIENT
Start: 2023-11-27 | End: 2023-11-30

## 2023-11-27 RX ORDER — FLUTICASONE PROPIONATE 50 MCG
1 SPRAY, SUSPENSION (ML) NASAL DAILY
Qty: 18.2 ML | Refills: 0 | Status: SHIPPED | OUTPATIENT
Start: 2023-11-27 | End: 2023-12-04

## 2023-11-27 NOTE — PROGRESS NOTES
"Subjective:      Patient ID: Giuseppe Valdez is a 38 y.o. male.    Vitals:  height is 5' 6" (1.676 m) and weight is 77.1 kg (170 lb). His oral temperature is 98.5 °F (36.9 °C). His blood pressure is 133/82 and his pulse is 60. His respiration is 19 and oxygen saturation is 97%.     Chief Complaint: Cough    This is a 38 y.o. male who presents today with a chief complaint of cough with dark yellow/brownish color that started 1 month, slight tickle in the back of the throat. 1 month ago it was just a slight cough, which has not gone away. And then last week the sputum started. Pt states that they have taken Mucinex, throat lozenges, Ibuprofen 200mg 2x daily to help with symptoms.  Provider note begins below    Patient states he was prescribed Tessalon Perles, Mucinex, ibuprofen.  The ibuprofen has helped some.  He is stopped taking the Tessalon Perles and Mucinex as they were not helping.  He reports a runny nose.  Denies any known ill contacts.  States his ears have been slow to so flight.  He does have an infant daughter at home and would like to make sure he does not have RSV.  He is a  in the [employer].  He travels frequently.    Cough  This is a new problem. The current episode started 1 to 4 weeks ago. The problem has been gradually worsening. The problem occurs constantly. The cough is Productive of sputum. Pertinent negatives include no chest pain, chills, ear congestion, ear pain, fever, headaches, heartburn, hemoptysis, myalgias, nasal congestion, postnasal drip, rash, rhinorrhea, sore throat, shortness of breath, sweats, weight loss or wheezing. Treatments tried: Mucinex, throat lozenges, Ibuprofen 200mg. The treatment provided mild relief. His past medical history is significant for asthma (childhood). There is no history of bronchiectasis, bronchitis, COPD, emphysema, environmental allergies or pneumonia.       Constitution: Negative for chills, sweating, fatigue and fever.   HENT:  Positive for " congestion. Negative for ear pain, postnasal drip and sore throat.    Cardiovascular:  Negative for chest pain and sob on exertion.   Respiratory:  Positive for cough and sputum production. Negative for bloody sputum, COPD, shortness of breath, stridor, wheezing and asthma.    Gastrointestinal:  Negative for abdominal pain, nausea, vomiting, constipation, diarrhea, bright red blood in stool and heartburn.   Musculoskeletal:  Negative for muscle ache.   Skin:  Negative for rash.   Allergic/Immunologic: Negative for environmental allergies and asthma.   Neurological:  Negative for headaches.      Objective:     Physical Exam   Constitutional: He is oriented to person, place, and time.   HENT:   Head: Normocephalic and atraumatic.   Ears:   Right Ear: Tympanic membrane is erythematous. A middle ear effusion is present.   Left Ear: A middle ear effusion is present.   Nose: Mucosal edema, rhinorrhea and purulent discharge present.   Mouth/Throat: Uvula is midline, oropharynx is clear and moist and mucous membranes are normal.   Cardiovascular: Normal rate and regular rhythm.   Pulmonary/Chest: Effort normal and breath sounds normal. No stridor. No respiratory distress. He has no wheezes. He has no rhonchi. He has no rales. He exhibits no tenderness.   Abdominal: Normal appearance.   Neurological: He is alert and oriented to person, place, and time.   Skin: Skin is dry.   Psychiatric: His behavior is normal. Mood normal.         X-Ray Chest PA And Lateral    Result Date: 11/27/2023  EXAMINATION: XR CHEST PA AND LATERAL CLINICAL HISTORY: Acute cough TECHNIQUE: PA and lateral views of the chest were performed. COMPARISON: None FINDINGS: Lungs are clear.  No focal consolidation, pleural fluid, or pneumothorax.  Normal heart size.     No acute findings. Electronically signed by: Kacey Hall Date:    11/27/2023 Time:    18:00      Assessment:     1. Acute bacterial bronchitis    2. Acute cough        Plan:   RSV test  negative  Illness started over 10 days ago.  Would not be beneficial to do a COVID or flu test at this point.  Flonase and Claritin to dry up nasal discharge   Short course of steroids it inhaler for likely bronchial inflammation and spasming   Antibiotics  Chest x-ray clear        Acute bacterial bronchitis  -     fluticasone propionate (FLONASE) 50 mcg/actuation nasal spray; 1 spray (50 mcg total) by Each Nostril route once daily. for 7 days  Dispense: 18.2 mL; Refill: 0  -     loratadine (CLARITIN) 10 mg tablet; Take 1 tablet (10 mg total) by mouth once daily. for 7 days  Dispense: 7 tablet; Refill: 0  -     predniSONE (DELTASONE) 20 MG tablet; Take 1 tablet (20 mg total) by mouth once daily. for 3 days  Dispense: 3 tablet; Refill: 0  -     albuterol (VENTOLIN HFA) 90 mcg/actuation inhaler; Inhale 2 puffs into the lungs every 6 (six) hours as needed for Wheezing. Rescue  Dispense: 18 g; Refill: 0  -     doxycycline (MONODOX) 100 MG capsule; Take 1 capsule (100 mg total) by mouth 2 (two) times daily. for 7 days  Dispense: 14 capsule; Refill: 0    Acute cough  -     X-Ray Chest PA And Lateral; Future; Expected date: 11/27/2023  -     POCT respiratory syncytial virus             Additional MDM:     Heart Failure Score:   COPD = No
